# Patient Record
Sex: FEMALE | Race: WHITE | NOT HISPANIC OR LATINO | Employment: FULL TIME | URBAN - METROPOLITAN AREA
[De-identification: names, ages, dates, MRNs, and addresses within clinical notes are randomized per-mention and may not be internally consistent; named-entity substitution may affect disease eponyms.]

---
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
## 2018-09-17 ENCOUNTER — TRANSFERRED RECORDS (OUTPATIENT)
Dept: HEALTH INFORMATION MANAGEMENT | Facility: CLINIC | Age: 18
End: 2018-09-17

## 2018-10-11 ENCOUNTER — TRANSFERRED RECORDS (OUTPATIENT)
Dept: HEALTH INFORMATION MANAGEMENT | Facility: CLINIC | Age: 18
End: 2018-10-11

## 2018-10-11 ENCOUNTER — MEDICAL CORRESPONDENCE (OUTPATIENT)
Dept: HEALTH INFORMATION MANAGEMENT | Facility: CLINIC | Age: 18
End: 2018-10-11

## 2018-10-17 NOTE — TELEPHONE ENCOUNTER
Date of appointment: 10/24/2018   Diagnosis/reason for appointment: Anemia  Referring provider/facility: Dr. Tena Conklin  Who called:    Recent Studies  Imaging:  Pathology:  Labs:  Previous chemo/radiation (if known):    Records requested from: [facility]  Records received from:    Additional information:

## 2018-10-23 ENCOUNTER — HEALTH MAINTENANCE LETTER (OUTPATIENT)
Age: 18
End: 2018-10-23

## 2018-10-24 ENCOUNTER — PRE VISIT (OUTPATIENT)
Dept: ONCOLOGY | Facility: CLINIC | Age: 18
End: 2018-10-24

## 2018-10-25 ENCOUNTER — ONCOLOGY VISIT (OUTPATIENT)
Dept: ONCOLOGY | Facility: CLINIC | Age: 18
End: 2018-10-25
Attending: INTERNAL MEDICINE
Payer: COMMERCIAL

## 2018-10-25 VITALS
RESPIRATION RATE: 16 BRPM | DIASTOLIC BLOOD PRESSURE: 75 MMHG | BODY MASS INDEX: 20.83 KG/M2 | HEART RATE: 70 BPM | HEIGHT: 64 IN | TEMPERATURE: 98 F | WEIGHT: 122 LBS | SYSTOLIC BLOOD PRESSURE: 120 MMHG | OXYGEN SATURATION: 70 %

## 2018-10-25 DIAGNOSIS — D69.6 THROMBOCYTOPENIA (H): ICD-10-CM

## 2018-10-25 DIAGNOSIS — R23.3 PETECHIAL RASH: Primary | ICD-10-CM

## 2018-10-25 DIAGNOSIS — R23.3 PETECHIAL RASH: ICD-10-CM

## 2018-10-25 LAB
BASOPHILS # BLD AUTO: 0 10E9/L (ref 0–0.2)
BASOPHILS NFR BLD AUTO: 0.5 %
COPATH REPORT: NORMAL
DIFFERENTIAL METHOD BLD: ABNORMAL
EOSINOPHIL # BLD AUTO: 0.1 10E9/L (ref 0–0.7)
EOSINOPHIL NFR BLD AUTO: 1.9 %
ERYTHROCYTE [DISTWIDTH] IN BLOOD BY AUTOMATED COUNT: 12.4 % (ref 10–15)
FERRITIN SERPL-MCNC: 22 NG/ML (ref 12–150)
HBV CORE AB SERPL QL IA: NONREACTIVE
HBV SURFACE AB SERPL IA-ACNC: 53.47 M[IU]/ML
HBV SURFACE AG SERPL QL IA: NONREACTIVE
HCT VFR BLD AUTO: 37.7 % (ref 35–47)
HCV AB SERPL QL IA: NONREACTIVE
HGB BLD-MCNC: 13 G/DL (ref 11.7–15.7)
HIV 1+2 AB+HIV1 P24 AG SERPL QL IA: NONREACTIVE
IMM GRANULOCYTES # BLD: 0 10E9/L (ref 0–0.4)
IMM GRANULOCYTES NFR BLD: 0.3 %
LYMPHOCYTES # BLD AUTO: 1.4 10E9/L (ref 0.8–5.3)
LYMPHOCYTES NFR BLD AUTO: 36.3 %
MCH RBC QN AUTO: 31.1 PG (ref 26.5–33)
MCHC RBC AUTO-ENTMCNC: 34.5 G/DL (ref 31.5–36.5)
MCV RBC AUTO: 90 FL (ref 78–100)
MONOCYTES # BLD AUTO: 0.4 10E9/L (ref 0–1.3)
MONOCYTES NFR BLD AUTO: 9.9 %
NEUTROPHILS # BLD AUTO: 1.9 10E9/L (ref 1.6–8.3)
NEUTROPHILS NFR BLD AUTO: 51.1 %
NRBC # BLD AUTO: 0 10*3/UL
NRBC BLD AUTO-RTO: 0 /100
PLATELET # BLD AUTO: 111 10E9/L (ref 150–450)
RBC # BLD AUTO: 4.18 10E12/L (ref 3.8–5.2)
RETICS # AUTO: 56.4 10E9/L (ref 25–95)
RETICS/RBC NFR AUTO: 1.4 % (ref 0.5–2)
RHEUMATOID FACT SER NEPH-ACNC: <20 IU/ML (ref 0–20)
TSH SERPL DL<=0.005 MIU/L-ACNC: 1.23 MU/L (ref 0.4–4)
VIT B12 SERPL-MCNC: 305 PG/ML (ref 193–986)
WBC # BLD AUTO: 3.7 10E9/L (ref 4–11)

## 2018-10-25 PROCEDURE — 85245 CLOT FACTOR VIII VW RISTOCTN: CPT | Performed by: INTERNAL MEDICINE

## 2018-10-25 PROCEDURE — 87389 HIV-1 AG W/HIV-1&-2 AB AG IA: CPT | Performed by: INTERNAL MEDICINE

## 2018-10-25 PROCEDURE — 85045 AUTOMATED RETICULOCYTE COUNT: CPT | Performed by: INTERNAL MEDICINE

## 2018-10-25 PROCEDURE — 85240 CLOT FACTOR VIII AHG 1 STAGE: CPT | Performed by: INTERNAL MEDICINE

## 2018-10-25 PROCEDURE — 00000167 ZZHCL STATISTIC INR NC: Performed by: INTERNAL MEDICINE

## 2018-10-25 PROCEDURE — 86704 HEP B CORE ANTIBODY TOTAL: CPT | Performed by: INTERNAL MEDICINE

## 2018-10-25 PROCEDURE — 85025 COMPLETE CBC W/AUTO DIFF WBC: CPT | Performed by: INTERNAL MEDICINE

## 2018-10-25 PROCEDURE — 40000611 ZZHCL STATISTIC MORPHOLOGY W/INTERP HEMEPATH TC 85060: Performed by: INTERNAL MEDICINE

## 2018-10-25 PROCEDURE — 99205 OFFICE O/P NEW HI 60 MIN: CPT | Mod: ZP | Performed by: INTERNAL MEDICINE

## 2018-10-25 PROCEDURE — 85246 CLOT FACTOR VIII VW ANTIGEN: CPT | Performed by: INTERNAL MEDICINE

## 2018-10-25 PROCEDURE — 82728 ASSAY OF FERRITIN: CPT | Performed by: INTERNAL MEDICINE

## 2018-10-25 PROCEDURE — 00000328 ZZHCL STATISTIC PTT NC: Performed by: INTERNAL MEDICINE

## 2018-10-25 PROCEDURE — 36415 COLL VENOUS BLD VENIPUNCTURE: CPT | Performed by: INTERNAL MEDICINE

## 2018-10-25 PROCEDURE — 87340 HEPATITIS B SURFACE AG IA: CPT | Performed by: INTERNAL MEDICINE

## 2018-10-25 PROCEDURE — 86431 RHEUMATOID FACTOR QUANT: CPT | Performed by: INTERNAL MEDICINE

## 2018-10-25 PROCEDURE — 84443 ASSAY THYROID STIM HORMONE: CPT | Performed by: INTERNAL MEDICINE

## 2018-10-25 PROCEDURE — G0463 HOSPITAL OUTPT CLINIC VISIT: HCPCS | Mod: ZF

## 2018-10-25 PROCEDURE — 86038 ANTINUCLEAR ANTIBODIES: CPT | Performed by: INTERNAL MEDICINE

## 2018-10-25 PROCEDURE — 86706 HEP B SURFACE ANTIBODY: CPT | Performed by: INTERNAL MEDICINE

## 2018-10-25 PROCEDURE — 00000401 ZZHCL STATISTIC THROMBIN TIME NC: Performed by: INTERNAL MEDICINE

## 2018-10-25 PROCEDURE — 82607 VITAMIN B-12: CPT | Performed by: INTERNAL MEDICINE

## 2018-10-25 PROCEDURE — 86803 HEPATITIS C AB TEST: CPT | Performed by: INTERNAL MEDICINE

## 2018-10-25 ASSESSMENT — PAIN SCALES - GENERAL: PAINLEVEL: NO PAIN (0)

## 2018-10-25 NOTE — NURSING NOTE
Oncology Rooming Note    October 25, 2018 7:11 AM   Victoria Solares is a 18 year old female who presents for:    Chief Complaint   Patient presents with     Oncology Clinic Visit     new - anemia      Initial Vitals: /75 (BP Location: Left arm, Patient Position: Chair, Cuff Size: Adult Regular)  Pulse 70  Temp 98  F (36.7  C) (Oral)  Resp 16  Wt 55.3 kg (122 lb)  SpO2 (!) 70% There is no height or weight on file to calculate BMI. There is no height or weight on file to calculate BSA.  No Pain (0) Comment: Data Unavailable   No LMP recorded.  Allergies reviewed: Yes  Medications reviewed: Yes    Medications: Medication refills not needed today.  Pharmacy name entered into EPIC: Data Unavailable    Clinical concerns: New patient      6 minutes for nursing intake (face to face time)     Aissatou Damon [facility]

## 2018-10-25 NOTE — PROGRESS NOTES
"Hematology Consult Note    Reason for consult: thrombocytopenia    History of present illness: Ms. Solares is referred by Tena Conklin PA-C at [facility] for thrombocytopenia.  She presented in September for some routine care regarding some anxiety and had a CBC with differential and conference of metabolic panel checked.  She was noted to have a mildly low platelet count at 132.  All other labs are normal.  Repeat check on 9/24/18 showed a platelets of 123.  Repeat check on 10/11/18 showed platelets 121, white count 3.9, hemoglobin 14.1, MCV 90.    She reports that this past summer she had a rash all across her abdomen and back which was raised, blotchy, pink.  She had some stinging and burning with it, no itching.  She went to a dermatologist and is felt to be \"a viral rash\" and went away in a few weeks.  However at that time the dermatologist noticed an atypical mole.  It was biopsied and it was severely atypic, but not melanoma.  She also notes that she had a skin lesion on her left arm several years ago that was removed and apparently it was some sort of vascular abnormality.  She does not have these anywhere else.    She also comments that she has had intermittent petechiae late in July or early August she had numerous petechiae on her legs and she was able to show me a picture.  They were not coalescing,  But were clearly petechiae.  Right now she has a couple on her neck and had some on her left antecubital fossa.  Those have resolved.  She has 1 small spots on her left neck.  She is also noted that she has tiny cherry angiomas on her chest and abdomen.  She has not noticed them elsewhere.  She does not have epistaxis, gum bleeding, melena, hematochezia, easy bruising.  She has had menorrhagia in the past whereby she would have to change a pad or tampon about every hour.  Her menses would last 7-9 days.  She was on iron tablets for a while.  She now has a Mirena IUD.  She had tonsils removed is a " "1-year-old with no excessive bleeding with that.      Past medical history:  -Infectious mononucleosis starting age 11, says she felt tired for several years.  -Anxiety and depression   -Status post tonsillectomy age 1, no bleeding issues.    Medications:  Fluoxetine  Hydroxyzine as needed  Mirena IUD  No herbal supplements, protein powder, megavitamins.  She drinks \"muscle milk\" which is soy based.    Family history:  Anxiety in mother, father, sister.  Maternal grandmother has lupus, maternal aunt has rheumatoid arthritis, paternal grandmother has breast cancer.  Mother does not have any autoimmune problems or menorrhagia.  She has a sister is healthy.  Father is healthy.      Review of Systems:  As in history above.  She says she is very intolerant, gluten is okay.  She is up-to-date on all the routine childhood vaccinations, including meningococcal vaccines and Gardasil.  She had a flu shot she thinks about September 8, no problems with that vaccination.  The rest of the >10 point ROS is negative.      PHYSICAL EXAMINATION:  /75 (BP Location: Left arm, Patient Position: Chair, Cuff Size: Adult Regular)  Pulse 70  Temp 98  F (36.7  C) (Oral)  Resp 16  Wt 55.3 kg (122 lb)  SpO2 (!) 70%    General appearance:  Patient is 17 yo woman, initially somewhat anxious appearing, but otherwise appears healthy.  HEENT:  No pallor, icterus, or mucositis.  No thyromegaly.  No angiomas on lips or tongue.  No wet purpura or petechiae and oral mucosa.  Lymph nodes: About 5 mm right occipital node, barely noticeable-she pointed out to me.  No cervical, supraclavicular, axillary, or inguinal lymphadenopathy.   Lungs:  Clear to auscultation bilaterally.   Heart:  Regular rate and rhythm; no S3 S4 or murmer.     Abdomen:  Positive bowel sounds, soft and nontender, nondistended.  No hepatomegaly. No splenomegaly appreciated.    Extremities:  No joint swelling or tenderness.  No ankle edema.     Skin: She has tiny cherry " angiomas on chest, none on fingers.  A few tiny petechiae on neck chest.  She has a slightly larger coalescence of petechiae left neck.  No rash,  Ecchymoses.    Labs pending.    Assessment and recommendation: This is a 18-year-old woman with mildly low platelet count 2 is had intermittent petechiae, also this unusual history of a rash.  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  Another cause of slightly low platelets is chronic infection such as HIV, hepatitis B, hepatitis C. Less likely B12 or iron deficiency or hypothyroidism.  Von Willebrand disease is a possibility although she did not have problems with her tonsillectomy, which would be a major stressor that usually would reveal that she has a bleeding inherited bleeding disorder.  I am not sure how to put this together with the angiomas. It is possible that she has hereditary hemorrhagic telangiectasia, however usually that presents with nosebleeds and there is usually a family history.      The plan today is to check these labs and I will be in contact with her:  -CBC DP, recheck, smear  -Hepatitis B, C, HIV  -JAY, RA  -Ferritin, B12, TSH reflex  -Von Willebrand disease panel    Tentative follow-up in 3 weeks, but will be adjusted depending on results.  May end up checking CBCs once a week to see fluctuation in her platelets.  She is to call if she starts developing a lot more petechiae and will get a CBC at that time.      Alesia Henry MD  Hematology

## 2018-10-25 NOTE — LETTER
"10/25/2018       RE: Victoria Solares  [address]  [facility] [address]     Dear Colleague,    Thank you for referring your patient, Victoria Solares, to the [facility]. Please see a copy of my visit note below.    Hematology Consult Note    Reason for consult: thrombocytopenia    History of present illness: Ms. Solares is referred by Tena Conklin PA-C at [facility] for thrombocytopenia.  She presented in September for some routine care regarding some anxiety and had a CBC with differential and conference of metabolic panel checked.  She was noted to have a mildly low platelet count at 132.  All other labs are normal.  Repeat check on 9/24/18 showed a platelets of 123.  Repeat check on 10/11/18 showed platelets 121, white count 3.9, hemoglobin 14.1, MCV 90.    She reports that this past summer she had a rash all across her abdomen and back which was raised, blotchy, pink.  She had some stinging and burning with it, no itching.  She went to a dermatologist and is felt to be \"a viral rash\" and went away in a few weeks.  However at that time the dermatologist noticed an atypical mole.  It was biopsied and it was severely atypic, but not melanoma.  She also notes that she had a skin lesion on her left arm several years ago that was removed and apparently it was some sort of vascular abnormality.  She does not have these anywhere else.    She also comments that she has had intermittent petechiae late in July or early August she had numerous petechiae on her legs and she was able to show me a picture.  They were not coalescing,  But were clearly petechiae.  Right now she has a couple on her neck and had some on her left antecubital fossa.  Those have resolved.  She has 1 small spots on her left neck.  She is also noted that she has tiny cherry angiomas on her chest and abdomen.  She has not noticed them elsewhere.  She does not have epistaxis, gum bleeding, melena, hematochezia, easy bruising.  She has " "had menorrhagia in the past whereby she would have to change a pad or tampon about every hour.  Her menses would last 7-9 days.  She was on iron tablets for a while.  She now has a Mirena IUD.  She had tonsils removed is a 1-year-old with no excessive bleeding with that.      Past medical history:  -Infectious mononucleosis starting age 11, says she felt tired for several years.  -Anxiety and depression   -Status post tonsillectomy age 1, no bleeding issues.    Medications:  Fluoxetine  Hydroxyzine as needed  Mirena IUD  No herbal supplements, protein powder, megavitamins.  She drinks \"muscle milk\" which is soy based.    Family history:  Anxiety in mother, father, sister.  Maternal grandmother has lupus, maternal aunt has rheumatoid arthritis, paternal grandmother has breast cancer.  Mother does not have any autoimmune problems or menorrhagia.  She has a sister is healthy.  Father is healthy.      Review of Systems:  As in history above.  She says she is very intolerant, gluten is okay.  She is up-to-date on all the routine childhood vaccinations, including meningococcal vaccines and Gardasil.  She had a flu shot she thinks about September 8, no problems with that vaccination.  The rest of the >10 point ROS is negative.      PHYSICAL EXAMINATION:  /75 (BP Location: Left arm, Patient Position: Chair, Cuff Size: Adult Regular)  Pulse 70  Temp 98  F (36.7  C) (Oral)  Resp 16  Wt 55.3 kg (122 lb)  SpO2 (!) 70%    General appearance:  Patient is 17 yo woman, initially somewhat anxious appearing, but otherwise appears healthy.  HEENT:  No pallor, icterus, or mucositis.  No thyromegaly.  No angiomas on lips or tongue.  No wet purpura or petechiae and oral mucosa.  Lymph nodes: About 5 mm right occipital node, barely noticeable-she pointed out to me.  No cervical, supraclavicular, axillary, or inguinal lymphadenopathy.   Lungs:  Clear to auscultation bilaterally.   Heart:  Regular rate and rhythm; no S3 S4 or " murmer.     Abdomen:  Positive bowel sounds, soft and nontender, nondistended.  No hepatomegaly. No splenomegaly appreciated.    Extremities:  No joint swelling or tenderness.  No ankle edema.     Skin: She has tiny cherry angiomas on chest, none on fingers.  A few tiny petechiae on neck chest.  She has a slightly larger coalescence of petechiae left neck.  No rash,  Ecchymoses.    Labs pending.    Assessment and recommendation: This is a 18-year-old woman with mildly low platelet count 2 is had intermittent petechiae, also this unusual history of a rash.  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  Another cause of slightly low platelets is chronic infection such as HIV, hepatitis B, hepatitis C. Less likely B12 or iron deficiency or hypothyroidism.  Von Willebrand disease is a possibility although she did not have problems with her tonsillectomy, which would be a major stressor that usually would reveal that she has a bleeding inherited bleeding disorder.  I am not sure how to put this together with the angiomas. It is possible that she has hereditary hemorrhagic telangiectasia, however usually that presents with nosebleeds and there is usually a family history.      The plan today is to check these labs and I will be in contact with her:  -CBC DP, recheck, smear  -Hepatitis B, C, HIV  -JAY, RA  -Ferritin, B12, TSH reflex  -Von Willebrand disease panel    Tentative follow-up in 3 weeks, but will be adjusted depending on results.  May end up checking CBCs once a week to see fluctuation in her platelets.  She is to call if she starts developing a lot more petechiae and will get a CBC at that time.      Alesia Henry MD  Hematology

## 2018-10-25 NOTE — MR AVS SNAPSHOT
After Visit Summary   10/25/2018    Victoria Solares    MRN: [id number]           Patient Information     Date Of Birth          2000        Visit Information        Provider Department      10/25/2018 7:00 AM Alesia Henry MD [facility]        Today's Diagnoses     Petechial rash    -  1    Thrombocytopenia (H)           Follow-ups after your visit        Follow-up notes from your care team     Return in about 3 weeks (around 11/15/2018) for f/u Elaine.      Your next 10 appointments already scheduled     Nov 21, 2018  8:00 AM CST   Lab with  LAB   [facility] ([facility])    [address]  1st Floor  [facility] [id number] [phone]            Nov 21, 2018  8:30 AM CST   (Arrive by 8:15 AM)   Return Visit with Alesia Henry MD   [facility] ([facility])    [address]  [address]  [facility] [address] [phone]              Future tests that were ordered for you today     Open Future Orders        Priority Expected Expires Ordered    CBC with platelets differential Routine 11/15/2018 1/25/2019 10/25/2018            Who to contact     If you have questions or need follow up information about today's clinic visit or your schedule please contact [facility] directly at [phone].  Normal or non-critical lab and imaging results will be communicated to you by MyChart, letter or phone within 4 business days after the clinic has received the results. If you do not hear from us within 7 days, please contact the clinic through MyChart or phone. If you have a critical or abnormal lab result, we will notify you by phone as soon as possible.  Submit refill requests through [employer] or call your pharmacy and they will forward the refill request to us. Please allow 3 business days for your refill to be completed.          Additional Information About Your  "Visit        MyChart Information     [employer] gives you secure access to your electronic health record. If you see a primary care provider, you can also send messages to your care team and make appointments. If you have questions, please call your primary care clinic.  If you do not have a primary care provider, please call [phone] and they will assist you.        Care EveryWhere ID     This is your Care EveryWhere ID. This could be used by other organizations to access your [facility] medical records  [id number]        Your Vitals Were     Pulse Temperature Respirations Height Pulse Oximetry BMI (Body Mass Index)    70 98  F (36.7  C) (Oral) 16 1.619 m (5' 3.75\") 70% 21.11 kg/m2       Blood Pressure from Last 3 Encounters:   10/25/18 120/75   10/11/17 112/67    Weight from Last 3 Encounters:   10/25/18 55.3 kg (122 lb)               Primary Care Provider Office Phone # Fax #    Sheree BROWN Love [phone]       [place] [id number] [address] [address]        Equal Access to Services     [facility]: Hadii aad ku hadasho Soomaali, waaxda luqadaha, qaybta kaalmada adeegyada, raquel hernandez . So [facility] [phone].    ATENCIÓN: Si habla español, tiene a guerrero disposición servicios gratCardFlightos de asistencia lingüística. [facility] [phone].    We comply with applicable federal civil rights laws and [place] laws. We do not discriminate on the basis of race, color, national origin, age, disability, sex, sexual orientation, or gender identity.            Thank you!     Thank you for choosing [facility]  for your care. Our goal is always to provide you with excellent care. Hearing back from our patients is one way we can continue to improve our services. Please take a few minutes to complete the written survey that you may receive in the mail after your visit with us. Thank you!             Your Updated Medication List - " Protect others around you: Learn how to safely use, store and throw away your medicines at www.disposemymeds.org.          This list is accurate as of 10/25/18  8:16 AM.  Always use your most recent med list.                   Brand Name Dispense Instructions for use Diagnosis    etonogestrel-ethinyl estradiol 0.12-0.015 MG/24HR vaginal ring    NUVARING     Place 1 each vaginally every 28 days        FLUOXETINE HCL PO      Take 10 mg by mouth daily        HYDROXYZINE HCL PO      Take by mouth 3 times daily as needed for itching

## 2018-10-26 LAB
ANA SER QL IF: NEGATIVE
FACT VIII ACT/NOR PPP: 106 % (ref 55–200)
VON WILLEBRAND INTERPRETATION: NORMAL
VWF CBA/VWF AG PPP IA-RTO: 122 % (ref 50–200)
VWF:AC ACT/NOR PPP IA: 100 % (ref 50–180)

## 2018-11-21 ENCOUNTER — ONCOLOGY VISIT (OUTPATIENT)
Dept: ONCOLOGY | Facility: CLINIC | Age: 18
End: 2018-11-21
Attending: INTERNAL MEDICINE
Payer: COMMERCIAL

## 2018-11-21 VITALS
RESPIRATION RATE: 18 BRPM | BODY MASS INDEX: 21.07 KG/M2 | TEMPERATURE: 97.8 F | HEIGHT: 64 IN | SYSTOLIC BLOOD PRESSURE: 119 MMHG | WEIGHT: 123.4 LBS | HEART RATE: 71 BPM | DIASTOLIC BLOOD PRESSURE: 78 MMHG | OXYGEN SATURATION: 100 %

## 2018-11-21 DIAGNOSIS — D69.6 THROMBOCYTOPENIA (H): Primary | ICD-10-CM

## 2018-11-21 DIAGNOSIS — D69.6 THROMBOCYTOPENIA (H): ICD-10-CM

## 2018-11-21 DIAGNOSIS — R23.3 PETECHIAL RASH: ICD-10-CM

## 2018-11-21 LAB
BASOPHILS # BLD AUTO: 0 10E9/L (ref 0–0.2)
BASOPHILS NFR BLD AUTO: 0.9 %
DIFFERENTIAL METHOD BLD: ABNORMAL
EOSINOPHIL # BLD AUTO: 0.1 10E9/L (ref 0–0.7)
EOSINOPHIL NFR BLD AUTO: 2.1 %
ERYTHROCYTE [DISTWIDTH] IN BLOOD BY AUTOMATED COUNT: 12.3 % (ref 10–15)
HCT VFR BLD AUTO: 41.4 % (ref 35–47)
HGB BLD-MCNC: 14 G/DL (ref 11.7–15.7)
IMM GRANULOCYTES # BLD: 0 10E9/L (ref 0–0.4)
IMM GRANULOCYTES NFR BLD: 0 %
LYMPHOCYTES # BLD AUTO: 2.2 10E9/L (ref 0.8–5.3)
LYMPHOCYTES NFR BLD AUTO: 49.4 %
MCH RBC QN AUTO: 30.8 PG (ref 26.5–33)
MCHC RBC AUTO-ENTMCNC: 33.8 G/DL (ref 31.5–36.5)
MCV RBC AUTO: 91 FL (ref 78–100)
MONOCYTES # BLD AUTO: 0.4 10E9/L (ref 0–1.3)
MONOCYTES NFR BLD AUTO: 8.7 %
NEUTROPHILS # BLD AUTO: 1.7 10E9/L (ref 1.6–8.3)
NEUTROPHILS NFR BLD AUTO: 38.9 %
NRBC # BLD AUTO: 0 10*3/UL
NRBC BLD AUTO-RTO: 0 /100
PLATELET # BLD AUTO: 142 10E9/L (ref 150–450)
RBC # BLD AUTO: 4.55 10E12/L (ref 3.8–5.2)
WBC # BLD AUTO: 4.4 10E9/L (ref 4–11)

## 2018-11-21 PROCEDURE — G0463 HOSPITAL OUTPT CLINIC VISIT: HCPCS | Mod: ZF

## 2018-11-21 PROCEDURE — 36415 COLL VENOUS BLD VENIPUNCTURE: CPT | Performed by: INTERNAL MEDICINE

## 2018-11-21 PROCEDURE — 85025 COMPLETE CBC W/AUTO DIFF WBC: CPT | Performed by: INTERNAL MEDICINE

## 2018-11-21 PROCEDURE — 99213 OFFICE O/P EST LOW 20 MIN: CPT | Mod: ZP | Performed by: INTERNAL MEDICINE

## 2018-11-21 ASSESSMENT — PAIN SCALES - GENERAL: PAINLEVEL: NO PAIN (0)

## 2018-11-21 NOTE — PROGRESS NOTES
"PROBLEM LIST:  1.  Thrombocytopenia, mild  2.  History of infectious mononucleosis, age 11  3.  Anxiety and depression.    Interim history: Ms. Solares is here for follow-up of thrombocytopenia.  She is accompanied by her father.  I last saw her on 10/25/18.  On that day extensive evaluation including von Willebrand panel, JAY, rheumatoid factor, hepatitis B, hepatitis C, HIV, TSH, B12, ferritin were all normal.  She had a platelet count of 111 with a mildly low total white count of 3.7 within normal neutrophil count of 1.9 x 10^9/L per liter, Normal hemoglobin.    Today she is feeling okay.  She reports that she had a upper respiratory infection lasting about 10 days, felt back to normal about 2 days ago.  She also had this episode where she developed some pain and swelling behind her left knee when evening.  It resolved by the next day.  She did show me a picture wearing her popliteal space it does look a bit erythematous and edematous.  No epistaxis, gum bleeding, melena, hematochezia, easy bruising.    PHYSICAL EXAMINATION:  /78 (BP Location: Right arm, Patient Position: Sitting, Cuff Size: Adult Regular)  Pulse 71  Temp 97.8  F (36.6  C) (Tympanic)  Resp 18  Ht 1.619 m (5' 3.74\")  Wt 56 kg (123 lb 6.4 oz)  SpO2 100%  BMI 21.35 kg/m2    General appearance:  Patient is 17 yo woman in no acute distress.     HEENT:  No pallor, icterus, or mucositis.  No thyromegaly.   Lymph nodes:  No cervical, supraclavicular, axillary, or inguinal lymphadenopathy.   Lungs:  Clear to auscultation bilaterally.   Heart:  Regular rate and rhythm; no S3 S4 or murmer.     Abdomen:  Positive bowel sounds, soft and nontender, nondistended.  No hepatomegaly. No splenomegaly appreciated.    Extremities: No obvious swelling behind either knee.  No joint swelling or tenderness.  No ankle edema.     Skin: A few tiny cherry angiomas on right upper neck right arm.  No rash, no petechiae or ecchymoses.    Labs:  Results for JONATHAN, " SHIRA (MRN [id number]) as of 11/21/2018 08:58   Ref. Range 11/21/2018 08:13   WBC Latest Ref Range: 4.0 - 11.0 10e9/L 4.4   Hemoglobin Latest Ref Range: 11.7 - 15.7 g/dL 14.0   Hematocrit Latest Ref Range: 35.0 - 47.0 % 41.4   Platelet Count Latest Ref Range: 150 - 450 10e9/L 142 (L)   RBC Count Latest Ref Range: 3.8 - 5.2 10e12/L 4.55   MCV Latest Ref Range: 78 - 100 fl 91   MCH Latest Ref Range: 26.5 - 33.0 pg 30.8   MCHC Latest Ref Range: 31.5 - 36.5 g/dL 33.8   RDW Latest Ref Range: 10.0 - 15.0 % 12.3   Diff Method Unknown Automated Method   % Neutrophils Latest Units: % 38.9   % Lymphocytes Latest Units: % 49.4   % Monocytes Latest Units: % 8.7   % Eosinophils Latest Units: % 2.1   % Basophils Latest Units: % 0.9   % Immature Granulocytes Latest Units: % 0.0   Nucleated RBCs Latest Ref Range: 0 /100 0   Absolute Neutrophil Latest Ref Range: 1.6 - 8.3 10e9/L 1.7   Absolute Lymphocytes Latest Ref Range: 0.8 - 5.3 10e9/L 2.2   Absolute Monocytes Latest Ref Range: 0.0 - 1.3 10e9/L 0.4   Absolute Eosinophils Latest Ref Range: 0.0 - 0.7 10e9/L 0.1   Absolute Basophils Latest Ref Range: 0.0 - 0.2 10e9/L 0.0   Abs Immature Granulocytes Latest Ref Range: 0 - 0.4 10e9/L 0.0   Absolute Nucleated RBC Unknown 0.0     Results for SHIRA CASTILLO (MRN [id number]) as of 11/21/2018 14:07   Ref. Range 10/25/2018 08:25   Ferritin Latest Ref Range: 12 - 150 ng/mL 22   Rheumatoid Factor Latest Ref Range: <20 IU/mL <20   TSH Latest Ref Range: 0.40 - 4.00 mU/L 1.23   Vitamin B12 Latest Ref Range: 193 - 986 pg/mL 305   Results for SHIRA CASTILLO (MRN [id number]) as of 11/21/2018 14:07   Ref. Range 10/25/2018 08:23 10/25/2018 08:25   Factor 8 Assay Latest Ref Range: 55 - 200 %  106   von Willebrand Antigen Latest Ref Range: 50 - 200 %  122   von Willebrand Factor Activity Latest Ref Range: 50 - 180 % 100    Results for SHIRA CASTILLO (MRN [id number]) as of 11/21/2018 14:07   Ref. Range 10/25/2018 08:25   Hep B Surface Agn Latest Ref Range:  NR^Nonreactive  Nonreactive   Hepatitis B Surface Antibody Latest Ref Range: <8.00 m[IU]/mL 53.47 (H)   Hepatitis B Core Edith Latest Ref Range: NR^Nonreactive  Nonreactive   Hepatitis C Antibody Latest Ref Range: NR^Nonreactive  Nonreactive   HIV Antigen Antibody Combo Latest Ref Range: NR^Nonreactive     Nonreactive     JAY interpretation Negative  NEG^Negative  Final 10/25/2018  8:25 AM 51      Comment:                                        Reference range:   <1:40  NEGATIVE   1:40 - 1:80  BORDERLINE POSITIVE   >1:80 POSITIVE   Patient Name: SHIRA CASTILLO   MR#: [id number]   Specimen #: [id number]   Collected: 10/25/2018   Received: 10/25/2018   Reported: 10/25/2018 16:51   Ordering Phy(s): ALFRED RAMON     For improved result formatting, select 'View Enhanced Report Format' under    Linked Documents section.     TEST(S):   Blood Smear Morphology     FINAL DIAGNOSIS:   Peripheral blood smear:        Slight leukopenia        Thrombocytopenia with overall normal platelet morphology     I have personally reviewed all specimens and/or slides, including the   listed special stains, and used them   with my medical judgment to determine the final diagnosis.     Electronically signed out by:     Laz Starkey M.D.,[facility]     Technical testing/processing performed at [place], [place]     CLINICAL HISTORY:   18-year-old female with thrombocytopenia     CLINICAL LAB RESULTS:   Battery Order No. Lab Test Code Clinical Result Ref. Range Units Result   Date   Hemogram/Diff/PLT Q29825  WBC Count L 3.7 4.0-11.0 10e9/L 10/25/2018   08:33        RBC Count 4.18 3.8-5.2 10e12/L 10/25/2018 08:33        Hemoglobin 13.0 11.7-15.7 g/dL 10/25/2018 08:33        Hematocrit 37.7 35.0-47.0 % 10/25/2018 08:33        MCV 90  fl 10/25/2018 08:33        MCH 31.1 26.5-33.0 pg 10/25/2018 08:33        MCHC 34.5 31.5-36.5 g/dL 10/25/2018 08:33        RDW 12.4  10.0-15.0 % 10/25/2018 08:33        Platelet Count L 111 150-450 10e9/L 10/25/2018 08:33         SEE TEXT   10/25/2018 08:33        Text/Comments:   Automated Method        % Neutrophils 51.1  % 10/25/2018 08:33        % Lymphocytes 36.3  % 10/25/2018 08:33        % Monocytes 9.9  % 10/25/2018 08:33        % Eosinophils 1.9  % 10/25/2018 08:33        % Basophils 0.5  % 10/25/2018 08:33        % Immature Grans 0.3  % 10/25/2018 08:33        Nucleated RBCs 0 0 /100 10/25/2018 08:33        abs Neutrophils 1.9 1.6-8.3 10e9/L 10/25/2018 08:33        abs Lymphocytes 1.4 0.8-5.3 10e9/L 10/25/2018 08:33        abs Monocytes 0.4 0.0-1.3 10e9/L 10/25/2018 08:33        abs Eosinophils 0.1 0.0-0.7 10e9/L 10/25/2018 08:33        abs Basophils 0.0 0.0-0.2 10e9/L 10/25/2018 08:33        abs Imm Granulocytes 0.0 0-0.4 10e9/L 10/25/2018 08:33        abs NRBC 0.0   10/25/2018 08:33     Retic   Retic abs 56.4 25-95 10e9/L 10/25/2018 08:33     MICROSCOPIC DESCRIPTION:   Peripheral Blood   The red cells appear normochromic.  Poikilocytosis is minimal.     Polychromasia is not increased.  Rouleaux   formation is not increased. The morphology of the platelets is normal.     CPT Codes:   A: 04588-[id number]     TESTING LAB LOCATION:   [facility] [address]   [address] [place]   [address]     Assessment recommendation:  1.  Mild thrombocytopenia- extensive evaluation has been unrevealing.  I suspect that this is due to mild autoimmune thrombocytopenia (ITP) or suppression from a self-limited viral syndrome.  I discussed that these are diagnoses of exclusion.  She has had numerous tests with none of them pointing to anything seriously wrong.  Her hemoglobin and white count are normal, so it is unlikely that she has a primary bone marrow disorder.  It is also unlikely that she has a serious autoimmune condition, such as lupus in the absence of other symptoms.  Since the platelet  count has not completely resolved , I will have her follow-up with a repeat CBC with differential and see me in clinic in about 3 months.  She should contact me if she is having any bleeding symptoms or other concerns regarding her platelets.  2.  History of lump behind knee -this was likely a Baker's cyst, may have appeared red due to her rubbing on it.  There is no abnormality now.  I do not think it is in any way related to the mild thrombocytopenia.    Alesia Henry MD  Hematology

## 2018-11-21 NOTE — NURSING NOTE
"Oncology Rooming Note    November 21, 2018 8:28 AM   Victoria Solares is a 18 year old female who presents for:    Chief Complaint   Patient presents with     Oncology Clinic Visit     Return visit related to Anemia     Initial Vitals: Ht 1.619 m (5' 3.74\")  BMI 21.11 kg/m2 Estimated body mass index is 21.11 kg/(m^2) as calculated from the following:    Height as of this encounter: 1.619 m (5' 3.74\").    Weight as of 10/25/18: 55.3 kg (122 lb). Body surface area is 1.58 meters squared.  No Pain (0) Comment: Data Unavailable   No LMP recorded.  Allergies reviewed: Yes  Medications reviewed: Yes    Medications: Medication refills not needed today.  Pharmacy name entered into EPIC: Data Unavailable    Clinical concerns: No new concerns. Provideer was notified.    10 minutes for nursing intake (face to face time)     Maureen Parekh LPN            "

## 2018-11-21 NOTE — MR AVS SNAPSHOT
After Visit Summary   11/21/2018    Victoria Solares    MRN: [id number]           Patient Information     Date Of Birth          2000        Visit Information        Provider Department      11/21/2018 8:30 AM Alesia Henry MD [facility]         Follow-ups after your visit        Follow-up notes from your care team     Return in about 3 months (around 2/21/2019) for f/u Elaine.      Your next 10 appointments already scheduled     Feb 20, 2019  8:30 AM CST   Lab with  LAB   [facility])    [address] [place] [address]  1st Floor  [facility] [id number] [phone]            Feb 20, 2019  9:00 AM CST   (Arrive by 8:45 AM)   Return Visit with Alesia Henry MD   [facility] ([facility])    [address] [place] [address]  [address]  [facility] [id number] [phone]              Who to contact     If you have questions or need follow up information about today's clinic visit or your schedule please contact [facility] directly at [phone].  Normal or non-critical lab and imaging results will be communicated to you by MyChart, letter or phone within 4 business days after the clinic has received the results. If you do not hear from us within 7 days, please contact the clinic through [employer] or phone. If you have a critical or abnormal lab result, we will notify you by phone as soon as possible.  Submit refill requests through [employer] or call your pharmacy and they will forward the refill request to us. Please allow 3 business days for your refill to be completed.          Additional Information About Your Visit        MyChart Information     [facility] gives you secure access to your electronic health record. If you see a primary care provider, you can also send messages to your care team and make appointments. If you have questions, please call your primary care clinic.  If  "you do not have a primary care provider, please call [phone] and they will assist you.        Care EveryWhere ID     This is your Care EveryWhere ID. This could be used by other organizations to access your [facility] medical records  [id number]        Your Vitals Were     Pulse Temperature Respirations Height Pulse Oximetry BMI (Body Mass Index)    71 97.8  F (36.6  C) (Tympanic) 18 1.619 m (5' 3.74\") 100% 21.35 kg/m2       Blood Pressure from Last 3 Encounters:   11/21/18 119/78   10/25/18 120/75   10/11/17 112/67    Weight from Last 3 Encounters:   11/21/18 56 kg (123 lb 6.4 oz)   10/25/18 55.3 kg (122 lb)              Today, you had the following     No orders found for display       Primary Care Provider Office Phone # Fax #    Sheree BROWN Love [phone]       [address] [address]        Equal Access to Services     SIDDHARTHA ZABALA : Hadii aad ku hadasho Soomaali, waaxda luqadaha, qaybta kaalmada adeegyada, raquel camejo hayvinicio hernandez . So [facility] [phone].    ATENCIÓN: Si habla español, tiene a guerrero disposición servicios gratuitos de asistencia lingüística. Llame al [phone].    We comply with applicable federal civil rights laws and [place] laws. We do not discriminate on the basis of race, color, national origin, age, disability, sex, sexual orientation, or gender identity.            Thank you!     Thank you for choosing [facility]  for your care. Our goal is always to provide you with excellent care. Hearing back from our patients is one way we can continue to improve our services. Please take a few minutes to complete the written survey that you may receive in the mail after your visit with us. Thank you!             Your Updated Medication List - Protect others around you: Learn how to safely use, store and throw away your medicines at www.disposemymeds.org.          This list is accurate as of " 11/21/18  9:22 AM.  Always use your most recent med list.                   Brand Name Dispense Instructions for use Diagnosis    etonogestrel-ethinyl estradiol 0.12-0.015 MG/24HR vaginal ring    NUVARING     Place 1 each vaginally every 28 days        FLUOXETINE HCL PO      Take 10 mg by mouth daily        HYDROXYZINE HCL PO      Take by mouth 3 times daily as needed for itching        MIRENA (52 MG) 20 MCG/24HR IUD   Generic drug:  levonorgestrel

## 2018-11-21 NOTE — LETTER
"11/21/2018       RE: Victoria Solares  [address]  [facility] [address]     Dear Colleague,    Thank you for referring your patient, Victoria Solares, to the [facility]. Please see a copy of my visit note below.    PROBLEM LIST:  1.  Thrombocytopenia, mild  2.  History of infectious mononucleosis, age 11  3.  Anxiety and depression.    Interim history: Ms. Solares is here for follow-up of thrombocytopenia.  She is accompanied by her father.  I last saw her on 10/25/18.  On that day extensive evaluation including von Willebrand panel, JAY, rheumatoid factor, hepatitis B, hepatitis C, HIV, TSH, B12, ferritin were all normal.  She had a platelet count of 111 with a mildly low total white count of 3.7 within normal neutrophil count of 1.9 x 10^9/L per liter, Normal hemoglobin.    Today she is feeling okay.  She reports that she had a upper respiratory infection lasting about 10 days, felt back to normal about 2 days ago.  She also had this episode where she developed some pain and swelling behind her left knee when evening.  It resolved by the next day.  She did show me a picture wearing her popliteal space it does look a bit erythematous and edematous.  No epistaxis, gum bleeding, melena, hematochezia, easy bruising.    PHYSICAL EXAMINATION:  /78 (BP Location: Right arm, Patient Position: Sitting, Cuff Size: Adult Regular)  Pulse 71  Temp 97.8  F (36.6  C) (Tympanic)  Resp 18  Ht 1.619 m (5' 3.74\")  Wt 56 kg (123 lb 6.4 oz)  SpO2 100%  BMI 21.35 kg/m2    General appearance:  Patient is 19 yo woman in no acute distress.     HEENT:  No pallor, icterus, or mucositis.  No thyromegaly.   Lymph nodes:  No cervical, supraclavicular, axillary, or inguinal lymphadenopathy.   Lungs:  Clear to auscultation bilaterally.   Heart:  Regular rate and rhythm; no S3 S4 or murmer.     Abdomen:  Positive bowel sounds, soft and nontender, nondistended.  No hepatomegaly. No splenomegaly appreciated.    Extremities: " No obvious swelling behind either knee.  No joint swelling or tenderness.  No ankle edema.     Skin: A few tiny cherry angiomas on right upper neck right arm.  No rash, no petechiae or ecchymoses.    Labs:  Results for SHIRA CASTILLO (MRN [id number]) as of 11/21/2018 08:58   Ref. Range 11/21/2018 08:13   WBC Latest Ref Range: 4.0 - 11.0 10e9/L 4.4   Hemoglobin Latest Ref Range: 11.7 - 15.7 g/dL 14.0   Hematocrit Latest Ref Range: 35.0 - 47.0 % 41.4   Platelet Count Latest Ref Range: 150 - 450 10e9/L 142 (L)   RBC Count Latest Ref Range: 3.8 - 5.2 10e12/L 4.55   MCV Latest Ref Range: 78 - 100 fl 91   MCH Latest Ref Range: 26.5 - 33.0 pg 30.8   MCHC Latest Ref Range: 31.5 - 36.5 g/dL 33.8   RDW Latest Ref Range: 10.0 - 15.0 % 12.3   Diff Method Unknown Automated Method   % Neutrophils Latest Units: % 38.9   % Lymphocytes Latest Units: % 49.4   % Monocytes Latest Units: % 8.7   % Eosinophils Latest Units: % 2.1   % Basophils Latest Units: % 0.9   % Immature Granulocytes Latest Units: % 0.0   Nucleated RBCs Latest Ref Range: 0 /100 0   Absolute Neutrophil Latest Ref Range: 1.6 - 8.3 10e9/L 1.7   Absolute Lymphocytes Latest Ref Range: 0.8 - 5.3 10e9/L 2.2   Absolute Monocytes Latest Ref Range: 0.0 - 1.3 10e9/L 0.4   Absolute Eosinophils Latest Ref Range: 0.0 - 0.7 10e9/L 0.1   Absolute Basophils Latest Ref Range: 0.0 - 0.2 10e9/L 0.0   Abs Immature Granulocytes Latest Ref Range: 0 - 0.4 10e9/L 0.0   Absolute Nucleated RBC Unknown 0.0     Results for SHIRA CASTILLO (MRN [id number]) as of 11/21/2018 14:07   Ref. Range 10/25/2018 08:25   Ferritin Latest Ref Range: 12 - 150 ng/mL 22   Rheumatoid Factor Latest Ref Range: <20 IU/mL <20   TSH Latest Ref Range: 0.40 - 4.00 mU/L 1.23   Vitamin B12 Latest Ref Range: 193 - 986 pg/mL 305   Results for SHIRA CASTILLO (MRN [id number]) as of 11/21/2018 14:07   Ref. Range 10/25/2018 08:23 10/25/2018 08:25   Factor 8 Assay Latest Ref Range: 55 - 200 %  106   von Willebrand Antigen Latest  Ref Range: 50 - 200 %  122   von Willebrand Factor Activity Latest Ref Range: 50 - 180 % 100    Results for SHIRA CASTILLO (MRN [id number]) as of 11/21/2018 14:07   Ref. Range 10/25/2018 08:25   Hep B Surface Agn Latest Ref Range: NR^Nonreactive  Nonreactive   Hepatitis B Surface Antibody Latest Ref Range: <8.00 m[IU]/mL 53.47 (H)   Hepatitis B Core Edith Latest Ref Range: NR^Nonreactive  Nonreactive   Hepatitis C Antibody Latest Ref Range: NR^Nonreactive  Nonreactive   HIV Antigen Antibody Combo Latest Ref Range: NR^Nonreactive     Nonreactive     JAY interpretation Negative  NEG^Negative  Final 10/25/2018  8:25 AM 51      Comment:                                        Reference range:   <1:40  NEGATIVE   1:40 - 1:80  BORDERLINE POSITIVE   >1:80 POSITIVE   Patient Name: SHIRA CASTILLO   MR#: [id number]   Specimen #: [id number]   Collected: 10/25/2018   Received: 10/25/2018   Reported: 10/25/2018 16:51   Ordering Phy(s): ALFRED RAMON     For improved result formatting, select 'View Enhanced Report Format' under    Linked Documents section.     TEST(S):   Blood Smear Morphology     FINAL DIAGNOSIS:   Peripheral blood smear:        Slight leukopenia        Thrombocytopenia with overall normal platelet morphology     I have personally reviewed all specimens and/or slides, including the   listed special stains, and used them   with my medical judgment to determine the final diagnosis.     Electronically signed out by:     Laz Starkey M.D.,[facility]     Technical testing/processing performed at [place], [place]     CLINICAL HISTORY:   18-year-old female with thrombocytopenia     CLINICAL LAB RESULTS:   Battery Order No. Lab Test Code Clinical Result Ref. Range Units Result   Date   Hemogram/Diff/PLT Q52469  WBC Count L 3.7 4.0-11.0 10e9/L 10/25/2018   08:33        RBC Count 4.18 3.8-5.2 10e12/L 10/25/2018 08:33        Hemoglobin 13.0 11.7-15.7  g/dL 10/25/2018 08:33        Hematocrit 37.7 35.0-47.0 % 10/25/2018 08:33        MCV 90  fl 10/25/2018 08:33        MCH 31.1 26.5-33.0 pg 10/25/2018 08:33        MCHC 34.5 31.5-36.5 g/dL 10/25/2018 08:33        RDW 12.4 10.0-15.0 % 10/25/2018 08:33        Platelet Count L 111 150-450 10e9/L 10/25/2018 08:33         SEE TEXT   10/25/2018 08:33        Text/Comments:   Automated Method        % Neutrophils 51.1  % 10/25/2018 08:33        % Lymphocytes 36.3  % 10/25/2018 08:33        % Monocytes 9.9  % 10/25/2018 08:33        % Eosinophils 1.9  % 10/25/2018 08:33        % Basophils 0.5  % 10/25/2018 08:33        % Immature Grans 0.3  % 10/25/2018 08:33        Nucleated RBCs 0 0 /100 10/25/2018 08:33        abs Neutrophils 1.9 1.6-8.3 10e9/L 10/25/2018 08:33        abs Lymphocytes 1.4 0.8-5.3 10e9/L 10/25/2018 08:33        abs Monocytes 0.4 0.0-1.3 10e9/L 10/25/2018 08:33        abs Eosinophils 0.1 0.0-0.7 10e9/L 10/25/2018 08:33        abs Basophils 0.0 0.0-0.2 10e9/L 10/25/2018 08:33        abs Imm Granulocytes 0.0 0-0.4 10e9/L 10/25/2018 08:33        abs NRBC 0.0   10/25/2018 08:33     Retic   Retic abs 56.4 25-95 10e9/L 10/25/2018 08:33     MICROSCOPIC DESCRIPTION:   Peripheral Blood   The red cells appear normochromic.  Poikilocytosis is minimal.     Polychromasia is not increased.  Rouleaux   formation is not increased. The morphology of the platelets is normal.     CPT Codes:   A: 48141-ZWKKU     TESTING LAB LOCATION:   [facility], [address]   [id number]     Assessment recommendation:  1.  Mild thrombocytopenia- extensive evaluation has been unrevealing.  I suspect that this is due to mild autoimmune thrombocytopenia (ITP) or suppression from a self-limited viral syndrome.  I discussed that these are diagnoses of exclusion.  She has had numerous tests with none of them pointing to anything seriously wrong.  Her  hemoglobin and white count are normal, so it is unlikely that she has a primary bone marrow disorder.  It is also unlikely that she has a serious autoimmune condition, such as lupus in the absence of other symptoms.  Since the platelet count has not completely resolved , I will have her follow-up with a repeat CBC with differential and see me in clinic in about 3 months.  She should contact me if she is having any bleeding symptoms or other concerns regarding her platelets.  2.  History of lump behind knee -this was likely a Baker's cyst, may have appeared red due to her rubbing on it.  There is no abnormality now.  I do not think it is in any way related to the mild thrombocytopenia.    Alesia Henry MD  Hematology

## 2018-12-27 DIAGNOSIS — D69.6 THROMBOCYTOPENIA (H): ICD-10-CM

## 2018-12-27 LAB
BASOPHILS # BLD AUTO: 0 10E9/L (ref 0–0.2)
BASOPHILS NFR BLD AUTO: 0.5 %
DIFFERENTIAL METHOD BLD: ABNORMAL
EOSINOPHIL # BLD AUTO: 0.1 10E9/L (ref 0–0.7)
EOSINOPHIL NFR BLD AUTO: 1.1 %
ERYTHROCYTE [DISTWIDTH] IN BLOOD BY AUTOMATED COUNT: 13.4 % (ref 10–15)
FERRITIN SERPL-MCNC: 30 NG/ML (ref 12–150)
HCT VFR BLD AUTO: 40.6 % (ref 35–47)
HGB BLD-MCNC: 14.1 G/DL (ref 11.7–15.7)
IMM GRANULOCYTES # BLD: 0 10E9/L (ref 0–0.4)
IMM GRANULOCYTES NFR BLD: 0.2 %
LYMPHOCYTES # BLD AUTO: 1.6 10E9/L (ref 0.8–5.3)
LYMPHOCYTES NFR BLD AUTO: 25.8 %
MCH RBC QN AUTO: 31.8 PG (ref 26.5–33)
MCHC RBC AUTO-ENTMCNC: 34.7 G/DL (ref 31.5–36.5)
MCV RBC AUTO: 91 FL (ref 78–100)
MONOCYTES # BLD AUTO: 0.5 10E9/L (ref 0–1.3)
MONOCYTES NFR BLD AUTO: 7.3 %
NEUTROPHILS # BLD AUTO: 4 10E9/L (ref 1.6–8.3)
NEUTROPHILS NFR BLD AUTO: 65.1 %
NRBC # BLD AUTO: 0 10*3/UL
NRBC BLD AUTO-RTO: 0 /100
PLATELET # BLD AUTO: 130 10E9/L (ref 150–450)
RBC # BLD AUTO: 4.44 10E12/L (ref 3.8–5.2)
WBC # BLD AUTO: 6.2 10E9/L (ref 4–11)

## 2019-02-11 ENCOUNTER — NURSE TRIAGE (OUTPATIENT)
Dept: NURSING | Facility: CLINIC | Age: 19
End: 2019-02-11

## 2019-02-11 NOTE — TELEPHONE ENCOUNTER
Caller wants a sick note for [employer] class  Advised to check student  Web site @ [facility] for instructions regarding same   understands  and will comply   Felecia Arenas RN  FNA      Additional Information    General information question, no triage required and triager able to answer question    Protocols used: INFORMATION ONLY CALL-ADULT-

## 2019-02-19 DIAGNOSIS — D69.6 THROMBOCYTOPENIA (H): Primary | ICD-10-CM

## 2019-02-20 ENCOUNTER — ONCOLOGY VISIT (OUTPATIENT)
Dept: ONCOLOGY | Facility: CLINIC | Age: 19
End: 2019-02-20
Attending: INTERNAL MEDICINE
Payer: COMMERCIAL

## 2019-02-20 VITALS
TEMPERATURE: 97.1 F | OXYGEN SATURATION: 97 % | SYSTOLIC BLOOD PRESSURE: 123 MMHG | BODY MASS INDEX: 21.17 KG/M2 | HEART RATE: 82 BPM | RESPIRATION RATE: 18 BRPM | DIASTOLIC BLOOD PRESSURE: 74 MMHG | WEIGHT: 124 LBS | HEIGHT: 64 IN

## 2019-02-20 DIAGNOSIS — D69.6 THROMBOCYTOPENIA (H): Primary | ICD-10-CM

## 2019-02-20 DIAGNOSIS — D69.6 THROMBOCYTOPENIA (H): ICD-10-CM

## 2019-02-20 LAB
BASOPHILS # BLD AUTO: 0.1 10E9/L (ref 0–0.2)
BASOPHILS NFR BLD AUTO: 1.3 %
DIFFERENTIAL METHOD BLD: ABNORMAL
EOSINOPHIL # BLD AUTO: 0.1 10E9/L (ref 0–0.7)
EOSINOPHIL NFR BLD AUTO: 1.8 %
ERYTHROCYTE [DISTWIDTH] IN BLOOD BY AUTOMATED COUNT: 12.5 % (ref 10–15)
HCT VFR BLD AUTO: 43.8 % (ref 35–47)
HGB BLD-MCNC: 14.6 G/DL (ref 11.7–15.7)
IMM GRANULOCYTES # BLD: 0 10E9/L (ref 0–0.4)
IMM GRANULOCYTES NFR BLD: 0 %
LYMPHOCYTES # BLD AUTO: 1.7 10E9/L (ref 0.8–5.3)
LYMPHOCYTES NFR BLD AUTO: 45.4 %
MCH RBC QN AUTO: 30.7 PG (ref 26.5–33)
MCHC RBC AUTO-ENTMCNC: 33.3 G/DL (ref 31.5–36.5)
MCV RBC AUTO: 92 FL (ref 78–100)
MONOCYTES # BLD AUTO: 0.3 10E9/L (ref 0–1.3)
MONOCYTES NFR BLD AUTO: 7.7 %
NEUTROPHILS # BLD AUTO: 1.7 10E9/L (ref 1.6–8.3)
NEUTROPHILS NFR BLD AUTO: 43.8 %
NRBC # BLD AUTO: 0 10*3/UL
NRBC BLD AUTO-RTO: 0 /100
PLATELET # BLD AUTO: 154 10E9/L (ref 150–450)
RBC # BLD AUTO: 4.75 10E12/L (ref 3.8–5.2)
WBC # BLD AUTO: 3.8 10E9/L (ref 4–11)

## 2019-02-20 PROCEDURE — 36415 COLL VENOUS BLD VENIPUNCTURE: CPT | Performed by: INTERNAL MEDICINE

## 2019-02-20 PROCEDURE — 99213 OFFICE O/P EST LOW 20 MIN: CPT | Mod: GC | Performed by: INTERNAL MEDICINE

## 2019-02-20 PROCEDURE — G0463 HOSPITAL OUTPT CLINIC VISIT: HCPCS | Mod: ZF

## 2019-02-20 PROCEDURE — 85025 COMPLETE CBC W/AUTO DIFF WBC: CPT | Performed by: INTERNAL MEDICINE

## 2019-02-20 ASSESSMENT — PAIN SCALES - GENERAL: PAINLEVEL: NO PAIN (0)

## 2019-02-20 ASSESSMENT — MIFFLIN-ST. JEOR: SCORE: 1313.33

## 2019-02-20 NOTE — PROGRESS NOTES
"Hematology clinic note  Day of the visit: 2/20/2019  PROBLEM LIST:  1.  Thrombocytopenia, mild  2.  History of infectious mononucleosis, age 11  3.  Anxiety and depression.    Interim history: Ms. Solares is here for follow-up of thrombocytopenia.  She is accompanied by her mother. During the previous visits extensive evaluation  including von Willebrand panel, JAY, rheumatoid factor, hepatitis B, hepatitis C, HIV, TSH, B12, ferritin was done and they were normal.  Today she is feeling okay.  She reports that she has URI, no subjective fever however. Positive sick contacts. No epistaxis, gum bleeding, melena, hematochezia, easy bruising.    ROS: Review of her systems are negative except as above.     PHYSICAL EXAMINATION:  /74 (BP Location: Left arm, Patient Position: Sitting, Cuff Size: Adult Regular)   Pulse 82   Temp 97.1  F (36.2  C) (Tympanic)   Resp 18   Ht 1.619 m (5' 3.74\")   Wt 56.2 kg (124 lb)   SpO2 97%   BMI 21.46 kg/m      General appearance:  Patient is 17 yo woman in no acute distress.     HEENT:  No pallor, icterus, or mucositis.  No thyromegaly.   Lymph nodes:  No cervical, supraclavicular, axillary, or inguinal lymphadenopathy.   Lungs:  Clear to auscultation bilaterally.   Heart:  Regular rate and rhythm; no S3 S4 or murmer.     Abdomen:  Positive bowel sounds, soft and nontender, nondistended.  No hepatomegaly. No splenomegaly appreciated.    Extremities: No obvious swelling behind either knee.  No joint swelling or tenderness.  No ankle edema.     Skin: A few tiny cherry angiomas on right upper neck right arm.  No rash, no petechiae or ecchymoses.    Lab Results   Component Value Date    WBC 3.8 02/20/2019     Lab Results   Component Value Date    RBC 4.75 02/20/2019     Lab Results   Component Value Date    HGB 14.6 02/20/2019     Lab Results   Component Value Date    HCT 43.8 02/20/2019     No components found for: MCT  Lab Results   Component Value Date    MCV 92 02/20/2019 "     Lab Results   Component Value Date    MCH 30.7 02/20/2019     Lab Results   Component Value Date    MCHC 33.3 02/20/2019     Lab Results   Component Value Date    RDW 12.5 02/20/2019     Lab Results   Component Value Date     02/20/2019     Last Comprehensive Metabolic Panel:  Sodium   Date Value Ref Range Status   10/11/2017 143 133 - 144 mmol/L Final     Potassium   Date Value Ref Range Status   10/11/2017 3.5 3.4 - 5.3 mmol/L Final     Chloride   Date Value Ref Range Status   10/11/2017 112 (H) 96 - 110 mmol/L Final     Carbon Dioxide   Date Value Ref Range Status   10/11/2017 20 20 - 32 mmol/L Final     Anion Gap   Date Value Ref Range Status   10/11/2017 11 3 - 14 mmol/L Final     Glucose   Date Value Ref Range Status   10/11/2017 89 70 - 99 mg/dL Final     Urea Nitrogen   Date Value Ref Range Status   10/11/2017 10 7 - 19 mg/dL Final     Creatinine   Date Value Ref Range Status   10/11/2017 0.72 0.50 - 1.00 mg/dL Final     GFR Estimate   Date Value Ref Range Status   10/11/2017 >90 >60 mL/min/1.7m2 Final     Comment:     Non  GFR Calc     Calcium   Date Value Ref Range Status   10/11/2017 8.6 (L) 9.1 - 10.3 mg/dL Final         Assessment recommendation:  18 years old female with h/o thrombocytopenia and extensive negative work up, who is here to follow up with CBC showing normal Pt count today.    1.  Mild thrombocytopenia- extensive evaluation has been unrevealing.  She is not thrombocytopenic today. Her hemoglobin is normal, slightly leukopenic (but not neutrophilic); so it is unlikely that she has a primary bone marrow disorder.  It is also unlikely that she has a serious autoimmune condition in the absence of other symptoms.  Platelets has improved form previous visit. No need for further work up; no need for for follow up.    We had a long discussion with patient and her mother, explaining the previous episode of thrombocytopenia. We answered to her questions (she was concerned  about hematologic malignancy predisposing in thrombocytopenia), explained about possible causes.    We recommend against doing routine CBCs. Check only if a clear clinical indication- eg fever, unexplained bleeding.     Patient was seen and discussed with Dr. Henry.      Paradise Maharaj MD  Hem/Onc fellow    Attending Note:  I have reviewed the patient chart, and interviewed and examined the patient.  I agree with the assessment and plan.  Alesia Henry MD  Hematology

## 2019-02-20 NOTE — NURSING NOTE
"Oncology Rooming Note    February 20, 2019 8:42 AM   Victoria Solares is a 18 year old female who presents for:    Chief Complaint   Patient presents with     Oncology Clinic Visit     Visit related to anemia     Initial Vitals: /74 (BP Location: Left arm, Patient Position: Sitting, Cuff Size: Adult Regular)   Pulse 82   Temp 97.1  F (36.2  C) (Tympanic)   Resp 18   Ht 1.619 m (5' 3.74\")   Wt 56.2 kg (124 lb)   SpO2 97%   BMI 21.46 kg/m   Estimated body mass index is 21.46 kg/m  as calculated from the following:    Height as of this encounter: 1.619 m (5' 3.74\").    Weight as of this encounter: 56.2 kg (124 lb). Body surface area is 1.59 meters squared.  No Pain (0) Comment: Data Unavailable   No LMP recorded.  Allergies reviewed: Yes  Medications reviewed: Yes    Medications: Medication refills not needed today.  Pharmacy name entered into EPIC: Data Unavailable    Clinical concerns: No new concerns. Provider was notified.      Maureen Parekh LPN            "

## 2019-02-20 NOTE — LETTER
"2/20/2019       RE: Victoria Solares  [address]  [facility] [address]     Dear Colleague,    Thank you for referring your patient, Victoria Solares, to the [facility]. Please see a copy of my visit note below.    Hematology clinic note  Day of the visit: 2/20/2019  PROBLEM LIST:  1.  Thrombocytopenia, mild  2.  History of infectious mononucleosis, age 11  3.  Anxiety and depression.    Interim history: Ms. Solares is here for follow-up of thrombocytopenia.  She is accompanied by her mother. During the previous visits extensive evaluation  including von Willebrand panel, JAY, rheumatoid factor, hepatitis B, hepatitis C, HIV, TSH, B12, ferritin was done and they were normal.  Today she is feeling okay.  She reports that she has URI, no subjective fever however. Positive sick contacts. No epistaxis, gum bleeding, melena, hematochezia, easy bruising.    ROS: Review of her systems are negative except as above.     PHYSICAL EXAMINATION:  /74 (BP Location: Left arm, Patient Position: Sitting, Cuff Size: Adult Regular)   Pulse 82   Temp 97.1  F (36.2  C) (Tympanic)   Resp 18   Ht 1.619 m (5' 3.74\")   Wt 56.2 kg (124 lb)   SpO2 97%   BMI 21.46 kg/m       General appearance:  Patient is 19 yo woman in no acute distress.     HEENT:  No pallor, icterus, or mucositis.  No thyromegaly.   Lymph nodes:  No cervical, supraclavicular, axillary, or inguinal lymphadenopathy.   Lungs:  Clear to auscultation bilaterally.   Heart:  Regular rate and rhythm; no S3 S4 or murmer.     Abdomen:  Positive bowel sounds, soft and nontender, nondistended.  No hepatomegaly. No splenomegaly appreciated.    Extremities: No obvious swelling behind either knee.  No joint swelling or tenderness.  No ankle edema.     Skin: A few tiny cherry angiomas on right upper neck right arm.  No rash, no petechiae or ecchymoses.    Lab Results   Component Value Date    WBC 3.8 02/20/2019     Lab Results   Component Value Date    RBC 4.75 " 02/20/2019     Lab Results   Component Value Date    HGB 14.6 02/20/2019     Lab Results   Component Value Date    HCT 43.8 02/20/2019     No components found for: MCT  Lab Results   Component Value Date    MCV 92 02/20/2019     Lab Results   Component Value Date    MCH 30.7 02/20/2019     Lab Results   Component Value Date    MCHC 33.3 02/20/2019     Lab Results   Component Value Date    RDW 12.5 02/20/2019     Lab Results   Component Value Date     02/20/2019     Last Comprehensive Metabolic Panel:  Sodium   Date Value Ref Range Status   10/11/2017 143 133 - 144 mmol/L Final     Potassium   Date Value Ref Range Status   10/11/2017 3.5 3.4 - 5.3 mmol/L Final     Chloride   Date Value Ref Range Status   10/11/2017 112 (H) 96 - 110 mmol/L Final     Carbon Dioxide   Date Value Ref Range Status   10/11/2017 20 20 - 32 mmol/L Final     Anion Gap   Date Value Ref Range Status   10/11/2017 11 3 - 14 mmol/L Final     Glucose   Date Value Ref Range Status   10/11/2017 89 70 - 99 mg/dL Final     Urea Nitrogen   Date Value Ref Range Status   10/11/2017 10 7 - 19 mg/dL Final     Creatinine   Date Value Ref Range Status   10/11/2017 0.72 0.50 - 1.00 mg/dL Final     GFR Estimate   Date Value Ref Range Status   10/11/2017 >90 >60 mL/min/1.7m2 Final     Comment:     Non  GFR Calc     Calcium   Date Value Ref Range Status   10/11/2017 8.6 (L) 9.1 - 10.3 mg/dL Final         Assessment recommendation:  18 years old female with h/o thrombocytopenia and extensive negative work up, who is here to follow up with CBC showing normal Pt count today.    1.  Mild thrombocytopenia- extensive evaluation has been unrevealing.  She is not thrombocytopenic today. Her hemoglobin is normal, slightly leukopenic (but not neutrophilic); so it is unlikely that she has a primary bone marrow disorder.  It is also unlikely that she has a serious autoimmune condition in the absence of other symptoms.  Platelets has improved form  previous visit. No need for further work up; no need for for follow up.    We had a long discussion with patient and her mother, explaining the previous episode of thrombocytopenia. We answered to her questions (she was concerned about hematologic malignancy predisposing in thrombocytopenia), explained about possible causes.    We recommend against doing routine CBCs. Check only if a clear clinical indication- eg fever, unexplained bleeding.     Patient was seen and discussed with Dr. Henry.      Paradise Maharaj MD  Hem/Onc fellow    Attending Note:  I have reviewed the patient chart, and interviewed and examined the patient.  I agree with the assessment and plan.    Again, thank you for allowing me to participate in the care of your patient.      Sincerely,    Alesia Henry MD

## 2020-03-11 ENCOUNTER — HEALTH MAINTENANCE LETTER (OUTPATIENT)
Age: 20
End: 2020-03-11

## 2020-09-26 ENCOUNTER — VIRTUAL VISIT (OUTPATIENT)
Dept: FAMILY MEDICINE | Facility: OTHER | Age: 20
End: 2020-09-26
Payer: COMMERCIAL

## 2020-09-26 DIAGNOSIS — Z20.822 SUSPECTED COVID-19 VIRUS INFECTION: Primary | ICD-10-CM

## 2020-09-26 PROCEDURE — 99421 OL DIG E/M SVC 5-10 MIN: CPT | Performed by: PHYSICIAN ASSISTANT

## 2020-09-26 NOTE — PROGRESS NOTES
"Date: 2020 12:33:01  Clinician: Daquan Holcomb  Clinician NPI: [id number]  Patient: Victoria Crowder  Patient : 2000  Patient Address: [address] [place] [address]  Patient Phone: [phone]  Visit Protocol: URI  Patient Summary:  Victoria is a 19 year old ( : 2000 ) female who initiated a [facility] Visit for COVID-19 (Coronavirus) evaluation and screening. When asked the question \"Please sign me up to receive news, health information and promotions from [facility].\", Victoria responded \"No\".    Victoria states her symptoms started gradually 5-6 days ago. After her symptoms started, they improved and then got worse again.   Her symptoms consist of a cough, nasal congestion, rhinitis, facial pain or pressure, malaise, and a sore throat.   Symptom details     Nasal secretions: The color of her mucus is clear and yellow.    Cough: Victoria coughs a few times an hour and her cough is more bothersome at night. Phlegm does not come into her throat when she coughs. She believes her cough is caused by post-nasal drip.     Sore throat: Victoria reports having mild throat pain (1-3 on a 10 point pain scale), does not have exudate on her tonsils, and can swallow liquids. She is not sure if the lymph nodes in her neck are enlarged. A rash has not appeared on the skin since the sore throat started.     Facial pain or pressure: The facial pain or pressure feels worse when bending over or leaning forward.      Victoria denies having headache, ear pain, anosmia, vomiting, nausea, wheezing, fever, myalgias, chills, teeth pain, ageusia, and diarrhea. She also denies having a sinus infection within the past year, taking antibiotic medication in the past month, and having recent facial or sinus surgery in the past 60 days. She is not experiencing dyspnea.   Precipitating events  Within the past week, Victoria has not been exposed to someone with strep throat. She has not recently been exposed to someone with influenza. Victoria " "has been in close contact with the following high risk individuals: immunocompromised people.   Pertinent COVID-19 (Coronavirus) information  In the past 14 days, Victoria has not worked in a congregate living setting.   She does not work or volunteer as healthcare worker or a  and does not work or volunteer in a healthcare facility.   Victoria also has not lived in a congregate living setting in the past 14 days. She does not live with a healthcare worker.   Victoria has not had a close contact with a laboratory-confirmed COVID-19 patient within 14 days of symptom onset.   Since December 2019, Victoria and has had upper respiratory infection (URI) or influenza-like illness. Has not been diagnosed with lab-confirmed COVID-19 test      Date(s) of previous URI or influenza-like illness (free-text): Around Jan 25 - Feb 3     Symptoms Victoria experienced during previous URI or influenza-like illness as reported by the patient (free-text): tested positive for Influenza A        Pertinent medical history  Victoria does not get yeast infections when she takes antibiotics.   Victoria needs a return to work/school note.   Weight: 130 lbs   Victoria does not smoke or use smokeless tobacco.   She denies pregnancy and denies breastfeeding. She has menstruated in the past month.   Weight: 130 lbs  Reason for repeat visit for the same protocol within 24 hours:  I accidentally pressed \"yes\" to the rash question. It is not allowing me to continue because of this. I need to start the eval over  See the History of referred by protocol and completed visits section for details on previous visits (visits currently in queue to be diagnosed will not appear in this section).    MEDICATIONS: Mirena intrauterine, hydroxyzine HCl oral, fluoxetine oral, ALLERGIES: NKDA  Clinician Response:  Dear Victoria,   Your symptoms show that you may have coronavirus (COVID-19). This illness can cause fever, cough and trouble breathing. Many people get a mild case " "and get better on their own. Some people can get very sick.  What should I do?  We would like to test you for this virus.   1. Please call [phone] to schedule your visit. Explain that you were referred by [facility] to have a COVID-19 test. Be ready to share your [facility] visit ID number.  The following will serve as your written order for this COVID Test, ordered by me, for the indication of suspected COVID [Z20.828]: The test will be ordered in [facility], our electronic health record, after you are scheduled. It will show as ordered and authorized by Orlin Dior MD.  Order: COVID-19 (Coronavirus) PCR for SYMPTOMATIC testing from [facility].      2. When it's time for your COVID test:  Stay at least 6 feet away from others. (If someone will drive you to your test, stay in the backseat, as far away from the  as you can.)   Cover your mouth and nose with a mask, tissue or washcloth.  Go straight to the testing site. Don't make any stops on the way there or back.      3.Starting now: Stay home and away from others (self-isolate) until:   You've had no fever---and no medicine that reduces fever---for one full day (24 hours). And...   Your other symptoms have gotten better. For example, your cough or breathing has improved. And...   At least 10 days have passed since your symptoms started.       During this time, don't leave the house except for testing or medical care.   Stay in your own room, even for meals. Use your own bathroom if you can.   Stay away from others in your home. No hugging, kissing or shaking hands. No visitors.  Don't go to work, school or anywhere else.    Clean \"high touch\" surfaces often (doorknobs, counters, handles, etc.). Use a household cleaning spray or wipes. You'll find a full list of  on the EPA website: www.epa.gov/pesticide-registration/list-n-disinfectants-use-against-sars-cov-2.   Cover your mouth and nose with a mask, tissue or washcloth to avoid spreading germs.  Wash your hands " and face often. Use soap and water.  Caregivers in these groups are at risk for severe illness due to COVID-19:  o People 65 years and older  o People who live in a nursing home or long-term care facility  o People with chronic disease (lung, heart, cancer, diabetes, kidney, liver, immunologic)  o People who have a weakened immune system, including those who:   Are in cancer treatment  Take medicine that weakens the immune system, such as corticosteroids  Had a bone marrow or organ transplant  Have an immune deficiency  Have poorly controlled HIV or AIDS  Are obese (body mass index of 40 or higher)  Smoke regularly   o Caregivers should wear gloves while washing dishes, handling laundry and cleaning bedrooms and bathrooms.  o Use caution when washing and drying laundry: Don't shake dirty laundry, and use the warmest water setting that you can.  o For more tips, go to www.cdc.gov/coronavirus/2019-ncov/downloads/10Things.pdf.    4.Sign up for [employer]. We know it's scary to hear that you might have COVID-19. We want to track your symptoms to make sure you're okay over the next 2 weeks. Please look for an email from [employer]---this is a free, online program that we'll use to keep in touch. To sign up, follow the link in the email. Learn more at [email or web address]  How can I take care of myself?   Get lots of rest. Drink extra fluids (unless a doctor has told you not to).   Take Tylenol (acetaminophen) for fever or pain. If you have liver or kidney problems, ask your family doctor if it's okay to take Tylenol.   Adults can take either:    650 mg (two 325 mg pills) every 4 to 6 hours, or...   1,000 mg (two 500 mg pills) every 8 hours as needed.    Note: Don't take more than 3,000 mg in one day. Acetaminophen is found in many medicines (both prescribed and over-the-counter medicines). Read all labels to be sure you don't take too much.   For children, check the Tylenol bottle for the right dose. The  dose is based on the child's age or weight.    If you have other health problems (like cancer, heart failure, an organ transplant or severe kidney disease): Call your specialty clinic if you don't feel better in the next 2 days.       Know when to call 911. Emergency warning signs include:    Trouble breathing or shortness of breath Pain or pressure in the chest that doesn't go away Feeling confused like you haven't felt before, or not being able to wake up Bluish-colored lips or face.  Where can I get more information?   [facility] -- About COVID-19: [email or web address]   CDC -- What to Do If You're Sick: www.cdc.gov/coronavirus/2019-ncov/about/steps-when-sick.html   [facility] -- Ending Home Isolation: www.cdc.gov/coronavirus/2019-ncov/hcp/disposition-in-home-patients.html   [facility] -- Caring for Someone: www.cdc.gov/coronavirus/2019-ncov/if-you-are-sick/care-for-someone.html   [facility] -- Interim Guidance for Hospital Discharge to Home: www.[facility].[place]./diseases/coronavirus/hcp/hospdischarge.pdf   [facility] clinical trials (COVID-19 research studies): [email or web address].[email or web address]-clinical-trials    Below are the COVID-19 hotlines at the [place] [facility] ([facility]). Interpreters are available.    For health questions: Call 410-606-9721 or 1-540.327.2287 (7 a.m. to 7 p.m.) For questions about schools and childcare: Call 694-588-2160 or 1-332.867.8159 (7 a.m. to 7 p.m.)    Diagnosis: Acute upper respiratory infection, unspecified  Diagnosis ICD: J06.9  Additional Clinician Notes:   Influenza can take several weeks to resolve.&nbsp; If your symptoms are not improving, or your symptoms are worsening, please come to one of our urgent care locations for further evaluation.

## 2020-09-27 DIAGNOSIS — Z20.822 SUSPECTED COVID-19 VIRUS INFECTION: ICD-10-CM

## 2020-09-27 PROCEDURE — U0003 INFECTIOUS AGENT DETECTION BY NUCLEIC ACID (DNA OR RNA); SEVERE ACUTE RESPIRATORY SYNDROME CORONAVIRUS 2 (SARS-COV-2) (CORONAVIRUS DISEASE [COVID-19]), AMPLIFIED PROBE TECHNIQUE, MAKING USE OF HIGH THROUGHPUT TECHNOLOGIES AS DESCRIBED BY CMS-2020-01-R: HCPCS | Performed by: FAMILY MEDICINE

## 2020-09-28 LAB
SARS-COV-2 RNA SPEC QL NAA+PROBE: NOT DETECTED
SPECIMEN SOURCE: NORMAL

## 2020-12-27 ENCOUNTER — HEALTH MAINTENANCE LETTER (OUTPATIENT)
Age: 20
End: 2020-12-27

## 2021-04-25 ENCOUNTER — HEALTH MAINTENANCE LETTER (OUTPATIENT)
Age: 21
End: 2021-04-25

## 2021-10-09 ENCOUNTER — HEALTH MAINTENANCE LETTER (OUTPATIENT)
Age: 21
End: 2021-10-09

## 2022-02-11 PROCEDURE — 87070 CULTURE OTHR SPECIMN AEROBIC: CPT | Mod: ORL | Performed by: PHYSICIAN ASSISTANT

## 2022-02-11 PROCEDURE — 87205 SMEAR GRAM STAIN: CPT | Mod: ORL | Performed by: PHYSICIAN ASSISTANT

## 2022-02-14 ENCOUNTER — LAB REQUISITION (OUTPATIENT)
Dept: LAB | Facility: CLINIC | Age: 22
End: 2022-02-14
Payer: COMMERCIAL

## 2022-02-14 DIAGNOSIS — L30.8 OTHER SPECIFIED DERMATITIS: ICD-10-CM

## 2022-02-14 LAB
GRAM STAIN RESULT: ABNORMAL
GRAM STAIN RESULT: ABNORMAL

## 2022-02-16 LAB — BACTERIA SPEC CULT: NORMAL

## 2022-05-21 ENCOUNTER — HEALTH MAINTENANCE LETTER (OUTPATIENT)
Age: 22
End: 2022-05-21

## 2022-06-24 ENCOUNTER — TELEPHONE (OUTPATIENT)
Dept: BEHAVIORAL HEALTH | Facility: CLINIC | Age: 22
End: 2022-06-24

## 2022-09-17 ENCOUNTER — HEALTH MAINTENANCE LETTER (OUTPATIENT)
Age: 22
End: 2022-09-17

## 2023-03-31 ENCOUNTER — HOSPITAL ENCOUNTER (EMERGENCY)
Facility: CLINIC | Age: 23
Discharge: HOME | End: 2023-03-31
Attending: EMERGENCY MEDICINE | Admitting: EMERGENCY MEDICINE
Payer: COMMERCIAL

## 2023-03-31 VITALS
SYSTOLIC BLOOD PRESSURE: 120 MMHG | HEART RATE: 85 BPM | DIASTOLIC BLOOD PRESSURE: 71 MMHG | OXYGEN SATURATION: 95 % | RESPIRATION RATE: 16 BRPM | HEIGHT: 63 IN | WEIGHT: 117 LBS | BODY MASS INDEX: 20.73 KG/M2 | TEMPERATURE: 98.9 F

## 2023-03-31 DIAGNOSIS — R19.7 DIARRHEA, UNSPECIFIED TYPE: ICD-10-CM

## 2023-03-31 DIAGNOSIS — R55 SYNCOPE, UNSPECIFIED SYNCOPE TYPE: ICD-10-CM

## 2023-03-31 LAB
ALBUMIN SERPL BCG-MCNC: 5.1 G/DL (ref 3.5–5.2)
ALP SERPL-CCNC: 53 U/L (ref 35–104)
ALT SERPL W P-5'-P-CCNC: 17 U/L (ref 10–35)
ANION GAP SERPL CALCULATED.3IONS-SCNC: 16 MMOL/L (ref 7–15)
AST SERPL W P-5'-P-CCNC: 22 U/L (ref 10–35)
ATRIAL RATE - MUSE: 78 BPM
BASOPHILS # BLD AUTO: 0.1 10E3/UL (ref 0–0.2)
BASOPHILS NFR BLD AUTO: 0 %
BILIRUB SERPL-MCNC: 1 MG/DL
BUN SERPL-MCNC: 15.2 MG/DL (ref 6–20)
CALCIUM SERPL-MCNC: 9.6 MG/DL (ref 8.6–10)
CHLORIDE SERPL-SCNC: 107 MMOL/L (ref 98–107)
CREAT SERPL-MCNC: 0.7 MG/DL (ref 0.51–0.95)
DEPRECATED HCO3 PLAS-SCNC: 19 MMOL/L (ref 22–29)
DIASTOLIC BLOOD PRESSURE - MUSE: NORMAL MMHG
EOSINOPHIL # BLD AUTO: 0.1 10E3/UL (ref 0–0.7)
EOSINOPHIL NFR BLD AUTO: 1 %
ERYTHROCYTE [DISTWIDTH] IN BLOOD BY AUTOMATED COUNT: 12.3 % (ref 10–15)
GFR SERPL CREATININE-BSD FRML MDRD: >90 ML/MIN/1.73M2
GLUCOSE SERPL-MCNC: 124 MG/DL (ref 70–99)
HCG SERPL QL: NEGATIVE
HCT VFR BLD AUTO: 46 % (ref 35–47)
HGB BLD-MCNC: 15.7 G/DL (ref 11.7–15.7)
IMM GRANULOCYTES # BLD: 0 10E3/UL
IMM GRANULOCYTES NFR BLD: 0 %
INTERPRETATION ECG - MUSE: NORMAL
LYMPHOCYTES # BLD AUTO: 0.6 10E3/UL (ref 0.8–5.3)
LYMPHOCYTES NFR BLD AUTO: 5 %
MCH RBC QN AUTO: 31.2 PG (ref 26.5–33)
MCHC RBC AUTO-ENTMCNC: 34.1 G/DL (ref 31.5–36.5)
MCV RBC AUTO: 91 FL (ref 78–100)
MONOCYTES # BLD AUTO: 0.5 10E3/UL (ref 0–1.3)
MONOCYTES NFR BLD AUTO: 5 %
NEUTROPHILS # BLD AUTO: 10.7 10E3/UL (ref 1.6–8.3)
NEUTROPHILS NFR BLD AUTO: 89 %
NRBC # BLD AUTO: 0 10E3/UL
NRBC BLD AUTO-RTO: 0 /100
P AXIS - MUSE: 75 DEGREES
PLATELET # BLD AUTO: 178 10E3/UL (ref 150–450)
POTASSIUM SERPL-SCNC: 3.4 MMOL/L (ref 3.4–5.3)
PR INTERVAL - MUSE: 164 MS
PROT SERPL-MCNC: 8.2 G/DL (ref 6.4–8.3)
QRS DURATION - MUSE: 88 MS
QT - MUSE: 390 MS
QTC - MUSE: 444 MS
R AXIS - MUSE: 92 DEGREES
RBC # BLD AUTO: 5.04 10E6/UL (ref 3.8–5.2)
SODIUM SERPL-SCNC: 142 MMOL/L (ref 136–145)
SYSTOLIC BLOOD PRESSURE - MUSE: NORMAL MMHG
T AXIS - MUSE: 67 DEGREES
VENTRICULAR RATE- MUSE: 78 BPM
WBC # BLD AUTO: 12 10E3/UL (ref 4–11)

## 2023-03-31 PROCEDURE — 85004 AUTOMATED DIFF WBC COUNT: CPT | Performed by: EMERGENCY MEDICINE

## 2023-03-31 PROCEDURE — 96374 THER/PROPH/DIAG INJ IV PUSH: CPT | Performed by: EMERGENCY MEDICINE

## 2023-03-31 PROCEDURE — 84703 CHORIONIC GONADOTROPIN ASSAY: CPT | Performed by: EMERGENCY MEDICINE

## 2023-03-31 PROCEDURE — 93005 ELECTROCARDIOGRAM TRACING: CPT | Performed by: EMERGENCY MEDICINE

## 2023-03-31 PROCEDURE — 80053 COMPREHEN METABOLIC PANEL: CPT | Performed by: EMERGENCY MEDICINE

## 2023-03-31 PROCEDURE — 250N000011 HC RX IP 250 OP 636: Performed by: EMERGENCY MEDICINE

## 2023-03-31 PROCEDURE — 36415 COLL VENOUS BLD VENIPUNCTURE: CPT | Performed by: EMERGENCY MEDICINE

## 2023-03-31 PROCEDURE — 99284 EMERGENCY DEPT VISIT MOD MDM: CPT | Mod: 25 | Performed by: EMERGENCY MEDICINE

## 2023-03-31 PROCEDURE — 258N000003 HC RX IP 258 OP 636: Performed by: EMERGENCY MEDICINE

## 2023-03-31 PROCEDURE — 93010 ELECTROCARDIOGRAM REPORT: CPT | Performed by: EMERGENCY MEDICINE

## 2023-03-31 PROCEDURE — 96361 HYDRATE IV INFUSION ADD-ON: CPT | Performed by: EMERGENCY MEDICINE

## 2023-03-31 PROCEDURE — 96376 TX/PRO/DX INJ SAME DRUG ADON: CPT | Performed by: EMERGENCY MEDICINE

## 2023-03-31 RX ORDER — ONDANSETRON 2 MG/ML
4 INJECTION INTRAMUSCULAR; INTRAVENOUS ONCE
Status: COMPLETED | OUTPATIENT
Start: 2023-03-31 | End: 2023-03-31

## 2023-03-31 RX ORDER — ONDANSETRON 4 MG/1
4 TABLET, ORALLY DISINTEGRATING ORAL EVERY 8 HOURS PRN
Qty: 10 TABLET | Refills: 0 | Status: SHIPPED | OUTPATIENT
Start: 2023-03-31 | End: 2023-04-03

## 2023-03-31 RX ORDER — SODIUM CHLORIDE 9 MG/ML
INJECTION, SOLUTION INTRAVENOUS CONTINUOUS
Status: DISCONTINUED | OUTPATIENT
Start: 2023-03-31 | End: 2023-03-31 | Stop reason: HOSPADM

## 2023-03-31 RX ADMIN — SODIUM CHLORIDE: 9 INJECTION, SOLUTION INTRAVENOUS at 05:04

## 2023-03-31 RX ADMIN — ONDANSETRON 4 MG: 2 INJECTION INTRAMUSCULAR; INTRAVENOUS at 03:39

## 2023-03-31 RX ADMIN — SODIUM CHLORIDE 1000 ML: 9 INJECTION, SOLUTION INTRAVENOUS at 03:40

## 2023-03-31 RX ADMIN — ONDANSETRON 4 MG: 2 INJECTION INTRAMUSCULAR; INTRAVENOUS at 06:03

## 2023-03-31 ASSESSMENT — ACTIVITIES OF DAILY LIVING (ADL): ADLS_ACUITY_SCORE: 35

## 2023-03-31 NOTE — DISCHARGE INSTRUCTIONS
Please make an appointment to follow up with Your Primary Care Provider to discuss possible cardiology referral.    Return to the emergency department if you develop recurrent episodes of fainting, worsening symptoms or if you have any further concerns

## 2023-03-31 NOTE — ED TRIAGE NOTES
Addended by: KIRA BARNES on: 3/6/2020 11:27 AM     Modules accepted: Orders     Pt states she went to bed feeling nauseated. States she woke up at about 0145 and partner stated she had a syncopal episode. States she was carried to the bathroom and had an incontinent episode of diarrhea at that time. Pt states she was coming in and out of consciousness and hands were stiff. Pt denies any dx medical hx. States she has fainted twice before. Last time was in June and before then the one before then was 5 or 6 years ago. The first time it happened she was seen in ED and discharged with a diagnosis of anxiety. The last time it happened in June, she was at her PCP's office, but no dx was given to her at that time.      Triage Assessment     Row Name 03/31/23 0251       Triage Assessment (Adult)    Airway WDL WDL       Respiratory WDL    Respiratory WDL WDL       Skin Circulation/Temperature WDL    Skin Circulation/Temperature WDL WDL       Cardiac WDL    Cardiac WDL WDL       Peripheral/Neurovascular WDL    Peripheral Neurovascular WDL WDL       Cognitive/Neuro/Behavioral WDL    Cognitive/Neuro/Behavioral WDL WDL

## 2023-03-31 NOTE — ED PROVIDER NOTES
"ED Provider Note  [facility]      History     Chief Complaint   Patient presents with     Nausea     Syncope     Diarrhea     HPI  Victoria Solares is a 22 year old female who is presenting with nausea, diarrhea and syncopal episode. She states she had watery diarrhea starting overnight today without black/blood. Also pronounced nausea without vomiting. No abd pain, dysuria or hematuria. Her sibling has had similar symptoms recently. No chest pain, SOB, palpitations before or after event. She passed out for a few seconds. This was on the toilet. No fall/head injury. Patient had no exertion prior to event. No postictal period, tongue biting, urinary incontinence. No leg pain/swelling or hx DVT. No recent drug/etoh abuse.    Past Medical History  Past Medical History:   Diagnosis Date     Anxiety      Panic attacks      Past Surgical History:   Procedure Laterality Date     TONSILLECTOMY       etonogestrel-ethinyl estradiol (NUVARING) 0.12-0.015 MG/24HR vaginal ring  FLUOXETINE HCL PO  HYDROXYZINE HCL PO  levonorgestrel (MIRENA, 52 MG,) 20 MCG/24HR IUD      Allergies   Allergen Reactions     Azithromycin Rash     Family History  No family history on file.  Social History   Social History     Tobacco Use     Smoking status: Never     Smokeless tobacco: Never   Substance Use Topics     Alcohol use: No     Drug use: No         A medically appropriate review of systems was performed with pertinent positives and negatives noted in the HPI, and all other systems negative.    Physical Exam   BP: 123/83  Pulse: 99  Resp: 15  Height: 160 cm (5' 3\")  Weight: 53.1 kg (117 lb)  SpO2: 100 %  Physical Exam  Physical Exam   Constitutional: oriented to person, place, and time. appears well-developed and well-nourished.   HENT:   Head: Normocephalic and atraumatic.   Neck: Normal range of motion.   Pulmonary/Chest: Effort normal. No respiratory distress. Clear lungs bilaterally.  Cardiac: No murmurs, rubs, " gallops. RRR.  Abdominal: Abdomen soft, nontender, nondistended. No rebound tenderness.  MSK: Long bones without deformity or evidence of trauma.  No lower extremity edema.  Neurological: alert and oriented to person, place, and time. Stable gait.  Sensation intact in all extremities.  Cranial nerves II through XII normal.   strength, bicep and tricep strength 5 out of 5 and symmetric.  Coordination normal.  Skin: Skin is warm and dry.   Psychiatric:  normal mood and affect.  behavior is normal. Thought content normal.       ED Course, Procedures, & Data      Procedures       ED Course Selections:        EKG Interpretation:      Interpreted by Maximus Hansen MD  Time reviewed: 0350  Symptoms at time of EKG: syncope   Rhythm: normal sinus   Rate: normal  Axis: RAD  Ectopy: none  Conduction: normal  ST Segments/ T Waves: No ST-T wave changes  Q Waves: none  Comparison to prior: Unchanged    Clinical Impression: no acute changes from prior. No evidence of WPW, brugada, prolonged qtc, ARVD, block, ischemia                     No results found for any visits on 03/31/23.  Medications   0.9% sodium chloride BOLUS (has no administration in time range)     Followed by   sodium chloride 0.9% infusion (has no administration in time range)   ondansetron (ZOFRAN) injection 4 mg (has no administration in time range)     Labs Ordered and Resulted from Time of ED Arrival to Time of ED Departure - No data to display  No orders to display          Critical care was not performed.     Medical Decision Making  The patient's presentation was of moderate complexity (an undiagnosed new problem with uncertain diagnosis).    The patient's evaluation involved:  review of external note(s) from 1 sources (prior ED visit)  ordering and/or review of 3+ test(s) in this encounter (see separate area of note for details)  strong consideration of a test (CT abd) that was ultimately deferred    The patient's management necessitated moderate  risk (prescription drug management including medications given in the ED).      Assessment & Plan    MDM  Patient presenting with nausea, diarrhea and an episode of syncope.  EKG does not show signs of Brugada, WPW, block, ischemia or other concern for precipitating event.  She does seem to faint fairly often, this is likely due to a bowel movement and episode of diarrhea.  She she has a mild anion gap and decreased bicarbonate likely due to diarrhea and dehydration.  She is given fluids.  She had no symptoms here in emergency department, feels quite well and is nontoxic.  No symptoms or signs of ACS or pulmonary embolism.  No symptoms or signs of sepsis.  She has a benign abdomen, unlikely obstruction, appendicitis, diverticulitis or other intra-abdominal catastrophe.  She is given Zofran for discharge and will follow up with primary care provider.    I have reviewed the nursing notes. I have reviewed the findings, diagnosis, plan and need for follow up with the patient.    New Prescriptions    ONDANSETRON (ZOFRAN ODT) 4 MG ODT TAB    Take 1 tablet (4 mg) by mouth every 8 hours as needed for nausea       Final diagnoses:   Syncope, unspecified syncope type   Diarrhea, unspecified type       Maximus Hansen  [facility] EMERGENCY DEPARTMENT  3/31/2023     Maximus Hansen MD  03/31/23 0577

## 2023-04-01 ENCOUNTER — NURSE TRIAGE (OUTPATIENT)
Dept: NURSING | Facility: CLINIC | Age: 23
End: 2023-04-01
Payer: COMMERCIAL

## 2023-04-01 ENCOUNTER — HOSPITAL ENCOUNTER (EMERGENCY)
Facility: CLINIC | Age: 23
Discharge: HOME | End: 2023-04-01
Attending: EMERGENCY MEDICINE | Admitting: EMERGENCY MEDICINE
Payer: COMMERCIAL

## 2023-04-01 VITALS
DIASTOLIC BLOOD PRESSURE: 80 MMHG | RESPIRATION RATE: 16 BRPM | TEMPERATURE: 97.6 F | HEART RATE: 75 BPM | OXYGEN SATURATION: 100 % | SYSTOLIC BLOOD PRESSURE: 125 MMHG

## 2023-04-01 DIAGNOSIS — R19.7 DIARRHEA, UNSPECIFIED TYPE: ICD-10-CM

## 2023-04-01 LAB
ANION GAP SERPL CALCULATED.3IONS-SCNC: 11 MMOL/L (ref 7–15)
BASOPHILS # BLD AUTO: 0 10E3/UL (ref 0–0.2)
BASOPHILS NFR BLD AUTO: 1 %
BUN SERPL-MCNC: 9.4 MG/DL (ref 6–20)
CALCIUM SERPL-MCNC: 9.2 MG/DL (ref 8.6–10)
CHLORIDE SERPL-SCNC: 103 MMOL/L (ref 98–107)
CREAT SERPL-MCNC: 0.65 MG/DL (ref 0.51–0.95)
DEPRECATED HCO3 PLAS-SCNC: 25 MMOL/L (ref 22–29)
EOSINOPHIL # BLD AUTO: 0.2 10E3/UL (ref 0–0.7)
EOSINOPHIL NFR BLD AUTO: 4 %
ERYTHROCYTE [DISTWIDTH] IN BLOOD BY AUTOMATED COUNT: 12.2 % (ref 10–15)
GFR SERPL CREATININE-BSD FRML MDRD: >90 ML/MIN/1.73M2
GLUCOSE BLDC GLUCOMTR-MCNC: 105 MG/DL (ref 70–99)
GLUCOSE SERPL-MCNC: 124 MG/DL (ref 70–99)
HCT VFR BLD AUTO: 39.3 % (ref 35–47)
HGB BLD-MCNC: 13.5 G/DL (ref 11.7–15.7)
HOLD SPECIMEN: NORMAL
IMM GRANULOCYTES # BLD: 0 10E3/UL
IMM GRANULOCYTES NFR BLD: 0 %
LYMPHOCYTES # BLD AUTO: 1.2 10E3/UL (ref 0.8–5.3)
LYMPHOCYTES NFR BLD AUTO: 26 %
MCH RBC QN AUTO: 31.6 PG (ref 26.5–33)
MCHC RBC AUTO-ENTMCNC: 34.4 G/DL (ref 31.5–36.5)
MCV RBC AUTO: 92 FL (ref 78–100)
MONOCYTES # BLD AUTO: 0.4 10E3/UL (ref 0–1.3)
MONOCYTES NFR BLD AUTO: 8 %
NEUTROPHILS # BLD AUTO: 2.9 10E3/UL (ref 1.6–8.3)
NEUTROPHILS NFR BLD AUTO: 61 %
NRBC # BLD AUTO: 0 10E3/UL
NRBC BLD AUTO-RTO: 0 /100
PLATELET # BLD AUTO: 147 10E3/UL (ref 150–450)
POTASSIUM SERPL-SCNC: 3.3 MMOL/L (ref 3.4–5.3)
RBC # BLD AUTO: 4.27 10E6/UL (ref 3.8–5.2)
SODIUM SERPL-SCNC: 139 MMOL/L (ref 136–145)
WBC # BLD AUTO: 4.7 10E3/UL (ref 4–11)

## 2023-04-01 PROCEDURE — 99283 EMERGENCY DEPT VISIT LOW MDM: CPT | Mod: 25 | Performed by: EMERGENCY MEDICINE

## 2023-04-01 PROCEDURE — 82310 ASSAY OF CALCIUM: CPT | Performed by: EMERGENCY MEDICINE

## 2023-04-01 PROCEDURE — 82962 GLUCOSE BLOOD TEST: CPT

## 2023-04-01 PROCEDURE — 36415 COLL VENOUS BLD VENIPUNCTURE: CPT | Performed by: EMERGENCY MEDICINE

## 2023-04-01 PROCEDURE — 85004 AUTOMATED DIFF WBC COUNT: CPT | Performed by: EMERGENCY MEDICINE

## 2023-04-01 PROCEDURE — 99283 EMERGENCY DEPT VISIT LOW MDM: CPT | Performed by: EMERGENCY MEDICINE

## 2023-04-01 PROCEDURE — 258N000003 HC RX IP 258 OP 636: Performed by: EMERGENCY MEDICINE

## 2023-04-01 PROCEDURE — 96360 HYDRATION IV INFUSION INIT: CPT | Performed by: EMERGENCY MEDICINE

## 2023-04-01 PROCEDURE — 96361 HYDRATE IV INFUSION ADD-ON: CPT | Performed by: EMERGENCY MEDICINE

## 2023-04-01 RX ADMIN — SODIUM CHLORIDE 1000 ML: 9 INJECTION, SOLUTION INTRAVENOUS at 17:34

## 2023-04-01 ASSESSMENT — ACTIVITIES OF DAILY LIVING (ADL)
ADLS_ACUITY_SCORE: 33
ADLS_ACUITY_SCORE: 35

## 2023-04-01 NOTE — ED TRIAGE NOTES
"Patient ambulatory to triage for \"feeling fainty.\" Patient states she feels very dehydrated and believes she just needs fluids. BG in triage is 105.       "

## 2023-04-01 NOTE — TELEPHONE ENCOUNTER
"The patient is complaining of feeling faint and shaking  Symptoms started a few days ago    The patient was seen in the ED on 04/01/23  No nausea today, did have a headache and now it is gone after taking ibuprofen    She thinks it may have been food poisoning    Triage guidelines recommend to go to ED now    Caller verbalized and understands directives'    Reason for Disposition    [1] Drinking very little AND [2] dehydration suspected (e.g., no urine > 12 hours, very dry mouth, very lightheaded)    Additional Information    Negative: Still unconscious    Negative: Difficult to awaken or acting confused (e.g., disoriented, slurred speech)    Negative: Shock suspected (e.g., cold/pale/clammy skin, too weak to stand, low BP, rapid pulse)    Negative: Difficulty breathing    Negative: Bluish (or gray) lips or face now    Negative: Chest pain    Negative: Extra heart beats or heart is beating fast (i.e.,\"palpitations\")    Negative: Bleeding (e.g., vomiting blood, rectal bleeding or tarry stools, severe vaginal bleeding)(Exception: fainted from sight of small amount of blood; small cut or abrasion)    Negative: Fainted suddenly after medicine, allergic food or bee sting    Negative: Age > 50 years (Exception: occurred > 1 hour ago AND now feels completely fine)    Negative: History of heart problems (e.g., congestive heart failure, heart attack)    Negative: [1] Fainted > 15 minutes ago AND [2] still feels too weak or dizzy to stand    Negative: Sounds like a life-threatening emergency to the triager    Negative: [1] Diabetes mellitus AND [2] fainting from low blood sugar (i.e., < 70 mg/dl or 3.9 mmol/l)    Negative: Seizure suspected (e.g., muscle jerking or shaking followed by confusion)    Negative: Heat exhaustion suspected (i.e., dehydration from heat exposure)    Negative: [1] Fainted > 15 minutes ago AND [2] still looks pale (pale skin, pallor)    Negative: [1] Fainted > 15 minutes ago AND [2] still feels weak or " dizzy    Negative: Occurred during exercise    Negative: Any head or face injury    Negative: Pregnant or possibly pregnant    Negative: Fainted 2 times in one day    Protocols used: [id number]

## 2023-04-01 NOTE — ED PROVIDER NOTES
[place] EMERGENCY DEPARTMENT ([facility])  April 1, 2023      History     Chief Complaint   Patient presents with     Dehydration     HPI  Victoria Solares is a 22 year old female who was seen in the ER yesterday, and was treated for a diarrheal illness with IV fluids and subsequently sent home.  Patient believes she had food poisoning, as she had multiple episodes of diarrhea over a 24-hour period. Patient's diarrhea was associated with an episode of syncope as well, and an EKG done as part of her work-up was unremarkable.  Patient states that since being discharged yesterday morning she has had approximately 5 more episodes of diarrhea with little p.o. intake and feels dehydrated.  Patient reports some fatigue, some generalized malaise, and reports back to the ER for evaluation. Labs done yesterday revealed a negative pregnancy test with a white count of 12 and a hemoglobin of 15.7 along with a comprehensive panel revealing a sodium of 142, potassium 3.4, carbon dioxide of 19, and anion gap of 16 with a glucose of 124 but normal liver enzymes.      Of note is that review of her medical records reveals that in mid February she had multiple stool studies done for a diarrheal illness including O&P, Cryptosporidium etc. all of which were negative      This part of the medical record was transcribed by Calixto Conway, Medical Scribe, from a dictation done by Franck Wright Md, MD.       Past Medical History  Past Medical History:   Diagnosis Date     Anxiety      Panic attacks      Past Surgical History:   Procedure Laterality Date     TONSILLECTOMY       etonogestrel-ethinyl estradiol (NUVARING) 0.12-0.015 MG/24HR vaginal ring  FLUOXETINE HCL PO  HYDROXYZINE HCL PO  levonorgestrel (MIRENA, 52 MG,) 20 MCG/24HR IUD  ondansetron (ZOFRAN ODT) 4 MG ODT tab      Allergies   Allergen Reactions     Azithromycin Rash     Family History  No family history on file.     Social History   Social History      Tobacco Use     Smoking status: Never     Smokeless tobacco: Never   Substance Use Topics     Alcohol use: No     Drug use: No      Past medical history, past surgical history, medications, allergies, family history, and social history were reviewed with the patient. No additional pertinent items.      A medically appropriate review of systems was performed with pertinent positives and negatives noted in the HPI, and all other systems negative.    Physical Exam   BP: 134/87  Pulse: 78  Temp: 97.6  F (36.4  C)  Resp: 16  SpO2: 100 %  Physical Exam  Vitals and nursing note reviewed.   Constitutional:       General: She is not in acute distress.     Comments: Conversant pleasant but appears slightly fatigued   Eyes:      Extraocular Movements: Extraocular movements intact.      Pupils: Pupils are equal, round, and reactive to light.   Cardiovascular:      Rate and Rhythm: Regular rhythm.   Pulmonary:      Breath sounds: Normal breath sounds.   Abdominal:      Palpations: Abdomen is soft.      Tenderness: There is no abdominal tenderness.   Musculoskeletal:         General: No deformity.      Cervical back: Neck supple.   Neurological:      General: No focal deficit present.      Mental Status: She is alert and oriented to person, place, and time.   Psychiatric:         Mood and Affect: Mood normal.           ED Course, Procedures, & Data      Procedures        Results for orders placed or performed during the hospital encounter of 04/01/23   Glucose by meter     Status: Abnormal   Result Value Ref Range    GLUCOSE BY METER POCT 105 (H) 70 - 99 mg/dL   Basic metabolic panel     Status: Abnormal   Result Value Ref Range    Sodium 139 136 - 145 mmol/L    Potassium 3.3 (L) 3.4 - 5.3 mmol/L    Chloride 103 98 - 107 mmol/L    Carbon Dioxide (CO2) 25 22 - 29 mmol/L    Anion Gap 11 7 - 15 mmol/L    Urea Nitrogen 9.4 6.0 - 20.0 mg/dL    Creatinine 0.65 0.51 - 0.95 mg/dL    Calcium 9.2 8.6 - 10.0 mg/dL    Glucose 124 (H) 70  - 99 mg/dL    GFR Estimate >90 >60 mL/min/1.73m2   CBC with platelets and differential     Status: Abnormal   Result Value Ref Range    WBC Count 4.7 4.0 - 11.0 10e3/uL    RBC Count 4.27 3.80 - 5.20 10e6/uL    Hemoglobin 13.5 11.7 - 15.7 g/dL    Hematocrit 39.3 35.0 - 47.0 %    MCV 92 78 - 100 fL    MCH 31.6 26.5 - 33.0 pg    MCHC 34.4 31.5 - 36.5 g/dL    RDW 12.2 10.0 - 15.0 %    Platelet Count 147 (L) 150 - 450 10e3/uL    % Neutrophils 61 %    % Lymphocytes 26 %    % Monocytes 8 %    % Eosinophils 4 %    % Basophils 1 %    % Immature Granulocytes 0 %    NRBCs per 100 WBC 0 <1 /100    Absolute Neutrophils 2.9 1.6 - 8.3 10e3/uL    Absolute Lymphocytes 1.2 0.8 - 5.3 10e3/uL    Absolute Monocytes 0.4 0.0 - 1.3 10e3/uL    Absolute Eosinophils 0.2 0.0 - 0.7 10e3/uL    Absolute Basophils 0.0 0.0 - 0.2 10e3/uL    Absolute Immature Granulocytes 0.0 <=0.4 10e3/uL    Absolute NRBCs 0.0 10e3/uL   [place] Draw     Status: None    Narrative    The following orders were created for panel order [place] Draw.  Procedure                               Abnormality         Status                     ---------                               -----------         ------                     Extra Blue Top Tube[[id number]]                              Final result               Extra Red Top Tube[[id number]]                               Final result               Extra Urine Collection[[id number]]                           Final result                 Please view results for these tests on the individual orders.   Extra Blue Top Tube     Status: None   Result Value Ref Range    Hold Specimen JIC    Extra Red Top Tube     Status: None   Result Value Ref Range    Hold Specimen JIC    Extra Urine Collection     Status: None   Result Value Ref Range    Hold Specimen JIC    CBC with platelets differential     Status: Abnormal    Narrative    The following orders were created for panel order CBC with platelets differential.  Procedure                                Abnormality         Status                     ---------                               -----------         ------                     CBC with platelets and d...[[id number]]  Abnormal            Final result                 Please view results for these tests on the individual orders.     Medications   sodium chloride (PF) 0.9% PF flush 3 mL (3 mLs Intracatheter Not Given 4/1/23 1739)   sodium chloride (PF) 0.9% PF flush 3 mL (has no administration in time range)   0.9% sodium chloride BOLUS (0 mLs Intravenous Stopped 4/1/23 1930)   Was given 2 L normal saline IV        Critical care was not performed.     Medical Decision Making  The patient's presentation was of low complexity (an acute and uncomplicated illness or injury).    The patient's evaluation involved:  review of external note(s) from 1 sources (See epic)  ordering and/or review of 3+ test(s) in this encounter (See above)  review of 3+ test result(s) ordered prior to this encounter (See epic)    The patient's management necessitated only low risk treatment.      Assessment & Plan      I have reviewed the nursing notes.    Patient received 2 L of normal saline IV here in the ER and was feeling better after this.  Patient will be sent home to start advancing her diet normally.    I have reviewed the findings, diagnosis, plan and need for follow up with the patient.    New Prescriptions    No medications on file       Final diagnoses:   Diarrhea, unspecified type     Home tonight with mother    Advance diet as tolerated    Please make an appointment to follow up with Your Primary Care Provider in 3-4 days if not improving.    Routine discharge instructions were given for this diagnosis    ICalixto, am serving as a trained medical scribe to document services personally performed by Franck Wright MD, based on the provider's statements to me.     IFranck MD, was physically present and have reviewed and  verified the accuracy of this note documented by Calixto Conway.      Franck Wright Md  [facility] EMERGENCY DEPARTMENT  4/1/2023     Franck Wright MD  04/01/23 1935

## 2023-04-02 NOTE — DISCHARGE INSTRUCTIONS
Home tonight with mother    Advance diet as tolerated    Please make an appointment to follow up with Your Primary Care Provider in 3-4 days if not improving.

## 2023-06-04 ENCOUNTER — HEALTH MAINTENANCE LETTER (OUTPATIENT)
Age: 23
End: 2023-06-04

## 2023-06-05 ENCOUNTER — HOSPITAL ENCOUNTER (EMERGENCY)
Facility: CLINIC | Age: 23
Discharge: HOME | End: 2023-06-05
Attending: EMERGENCY MEDICINE | Admitting: EMERGENCY MEDICINE
Payer: COMMERCIAL

## 2023-06-05 VITALS
DIASTOLIC BLOOD PRESSURE: 94 MMHG | OXYGEN SATURATION: 100 % | HEART RATE: 84 BPM | SYSTOLIC BLOOD PRESSURE: 133 MMHG | TEMPERATURE: 99.1 F | RESPIRATION RATE: 15 BRPM

## 2023-06-05 DIAGNOSIS — R40.4 TRANSIENT ALTERATION OF AWARENESS: ICD-10-CM

## 2023-06-05 DIAGNOSIS — R53.83 FATIGUE, UNSPECIFIED TYPE: ICD-10-CM

## 2023-06-05 LAB
ALBUMIN SERPL BCG-MCNC: 4.3 G/DL (ref 3.5–5.2)
ALP SERPL-CCNC: 47 U/L (ref 35–104)
ALT SERPL W P-5'-P-CCNC: 18 U/L (ref 10–35)
ANION GAP SERPL CALCULATED.3IONS-SCNC: 11 MMOL/L (ref 7–15)
AST SERPL W P-5'-P-CCNC: 36 U/L (ref 10–35)
BASOPHILS # BLD AUTO: 0.1 10E3/UL (ref 0–0.2)
BASOPHILS NFR BLD AUTO: 1 %
BILIRUB SERPL-MCNC: 0.4 MG/DL
BUN SERPL-MCNC: 13.8 MG/DL (ref 6–20)
CALCIUM SERPL-MCNC: 9.1 MG/DL (ref 8.6–10)
CHLORIDE SERPL-SCNC: 105 MMOL/L (ref 98–107)
CREAT SERPL-MCNC: 0.88 MG/DL (ref 0.51–0.95)
DEPRECATED HCO3 PLAS-SCNC: 23 MMOL/L (ref 22–29)
EOSINOPHIL # BLD AUTO: 0.2 10E3/UL (ref 0–0.7)
EOSINOPHIL NFR BLD AUTO: 4 %
ERYTHROCYTE [DISTWIDTH] IN BLOOD BY AUTOMATED COUNT: 12.7 % (ref 10–15)
GFR SERPL CREATININE-BSD FRML MDRD: >90 ML/MIN/1.73M2
GLUCOSE BLDC GLUCOMTR-MCNC: 106 MG/DL (ref 70–99)
GLUCOSE SERPL-MCNC: 109 MG/DL (ref 70–99)
HCT VFR BLD AUTO: 39.7 % (ref 35–47)
HGB BLD-MCNC: 13.4 G/DL (ref 11.7–15.7)
HOLD SPECIMEN: NORMAL
IMM GRANULOCYTES # BLD: 0 10E3/UL
IMM GRANULOCYTES NFR BLD: 0 %
LACTATE SERPL-SCNC: 0.8 MMOL/L (ref 0.7–2)
LYMPHOCYTES # BLD AUTO: 2 10E3/UL (ref 0.8–5.3)
LYMPHOCYTES NFR BLD AUTO: 33 %
MCH RBC QN AUTO: 31.2 PG (ref 26.5–33)
MCHC RBC AUTO-ENTMCNC: 33.8 G/DL (ref 31.5–36.5)
MCV RBC AUTO: 93 FL (ref 78–100)
MONOCYTES # BLD AUTO: 0.4 10E3/UL (ref 0–1.3)
MONOCYTES NFR BLD AUTO: 7 %
NEUTROPHILS # BLD AUTO: 3.4 10E3/UL (ref 1.6–8.3)
NEUTROPHILS NFR BLD AUTO: 55 %
NRBC # BLD AUTO: 0 10E3/UL
NRBC BLD AUTO-RTO: 0 /100
PLATELET # BLD AUTO: 169 10E3/UL (ref 150–450)
POTASSIUM SERPL-SCNC: 3.4 MMOL/L (ref 3.4–5.3)
PROT SERPL-MCNC: 7.2 G/DL (ref 6.4–8.3)
RBC # BLD AUTO: 4.29 10E6/UL (ref 3.8–5.2)
SODIUM SERPL-SCNC: 139 MMOL/L (ref 136–145)
WBC # BLD AUTO: 6.2 10E3/UL (ref 4–11)

## 2023-06-05 PROCEDURE — 99284 EMERGENCY DEPT VISIT MOD MDM: CPT | Performed by: EMERGENCY MEDICINE

## 2023-06-05 PROCEDURE — 80053 COMPREHEN METABOLIC PANEL: CPT | Performed by: EMERGENCY MEDICINE

## 2023-06-05 PROCEDURE — 85025 COMPLETE CBC W/AUTO DIFF WBC: CPT | Performed by: EMERGENCY MEDICINE

## 2023-06-05 PROCEDURE — 99283 EMERGENCY DEPT VISIT LOW MDM: CPT

## 2023-06-05 PROCEDURE — 82962 GLUCOSE BLOOD TEST: CPT

## 2023-06-05 PROCEDURE — 83605 ASSAY OF LACTIC ACID: CPT | Performed by: EMERGENCY MEDICINE

## 2023-06-05 PROCEDURE — 36415 COLL VENOUS BLD VENIPUNCTURE: CPT | Performed by: EMERGENCY MEDICINE

## 2023-06-05 ASSESSMENT — ACTIVITIES OF DAILY LIVING (ADL)
ADLS_ACUITY_SCORE: 35
ADLS_ACUITY_SCORE: 35

## 2023-06-05 NOTE — DISCHARGE INSTRUCTIONS
Please follow-up with your primary care provider and neurology.  A referral has been placed so neurology should call you to schedule an appointment.  Please continue your own medications.  Return to the emergency department if any worsening symptoms.

## 2023-06-05 NOTE — ED TRIAGE NOTES
"Pt ambulatory to triage with c/o fatigue. HX anxiety. Pt states that for the past few night she has abruptly woken from sleep and began shaking. Pt's partner states that during these episodes pt is very lethargic & altered, and not \"her normal self\". Pt states she is now back to her baseline, but is worried she may have had a seizure. Pt A&Ox4, VSS on RA, denies pain.      Triage Assessment     Row Name 06/05/23 0159       Triage Assessment (Adult)    Airway WDL WDL       Respiratory WDL    Respiratory WDL WDL       Skin Circulation/Temperature WDL    Skin Circulation/Temperature WDL WDL       Cardiac WDL    Cardiac WDL WDL       Peripheral/Neurovascular WDL    Peripheral Neurovascular WDL WDL       Cognitive/Neuro/Behavioral WDL    Cognitive/Neuro/Behavioral WDL WDL              "

## 2023-06-05 NOTE — ED PROVIDER NOTES
ED Provider Note  [facility]      History     Chief Complaint   Patient presents with     Fatigue     HPI  Victoria Solares is a 22 year old female who has a past medical history of anxiety who presents the emergency department for evaluation of fatigue.  Patient reports that last night she went to bed however woke up around 12:30 AM this morning in a disorientated state.  Patient states that she woke up feeling as if she was almost in a dream.  Patient states that after that she had a short episode where she was disoriented and her whole body was shaking.  Patient's partner witnessed the event and stated that she was very lethargic, altered, not her normal self.  Patient reports that she was awake during the episode but disoriented.  Patient states it is difficult to describe.  Patient also reports feeling some nausea, cold and tingling sensations.  Patient states currently is at her baseline.  Patient reports approximately 2-3 similar episodes however those other episodes were also associated with syncope.  Patient has been following up with her primary care provider, and has been in the emergency department as well.  Patient is scheduled for an MRI this afternoon however is also wondering if she can get a EEG.  Patient denies any fever, chest pain, shortening, cough or cold-like symptoms.  Denies any abdominal pain, vomiting, dysuria, incontinence.  No other complaints.  Patient reports social alcohol use, denies any substance use.  No other new medications.  No other symptoms.    Past Medical History  Past Medical History:   Diagnosis Date     Anxiety      Panic attacks      Past Surgical History:   Procedure Laterality Date     TONSILLECTOMY       etonogestrel-ethinyl estradiol (NUVARING) 0.12-0.015 MG/24HR vaginal ring  FLUOXETINE HCL PO  HYDROXYZINE HCL PO  levonorgestrel (MIRENA, 52 MG,) 20 MCG/24HR IUD      Allergies   Allergen Reactions     Azithromycin Rash     Family History  No  family history on file.  Social History   Social History     Tobacco Use     Smoking status: Never     Smokeless tobacco: Never   Substance Use Topics     Alcohol use: No     Drug use: No      Past medical history, past surgical history, medications, allergies, family history, and social history were reviewed with the patient. No additional pertinent items.      A medically appropriate review of systems was performed with pertinent positives and negatives noted in the HPI, and all other systems negative.    Physical Exam   BP: (!) 149/91  Pulse: 82  Temp: 99.1  F (37.3  C)  Resp: 15  SpO2: 100 %  Physical Exam  General: Afebrile, no acute distress   HEENT: Normocephalic, atraumatic, conjunctivae normal. MMM  Neck: non-tender, supple  Cardio: regular rate. regular rhythm   Resp: Normal work of breathing, no respiratory distress, lungs clear bilaterally, no wheezing, rhonchi, rales  Chest/Back: no visual signs of trauma, no CVA tenderness   Abdomen: soft, non distension, no tenderness, no peritoneal signs   Neuro: alert and fully oriented. CN II-XII grossly intact. Grossly normal strength and sensation in all extremities.   MSK: no deformities. Normal range of motion  Integumentary/Skin: no rash visualized, normal color  Psych: normal affect, normal behavior      ED Course, Procedures, & Data      Procedures       Results for orders placed or performed during the hospital encounter of 06/05/23   [place] Draw     Status: None    Narrative    The following orders were created for panel order [place] Draw.  Procedure                               Abnormality         Status                     ---------                               -----------         ------                     Extra Blue Top Tube[[id number]]                              Final result               Extra Red Top Tube[[id number]]                               Final result               Extra Green Top (Lithium...[[id number]]                      Final result                Extra Purple Top Tube[[id number]]                            Final result               Extra Green Top (Lithium...[[id number]]                      Final result                 Please view results for these tests on the individual orders.   Extra Blue Top Tube     Status: None   Result Value Ref Range    Hold Specimen JIC    Extra Red Top Tube     Status: None   Result Value Ref Range    Hold Specimen JIC    Extra Green Top (Lithium Heparin) Tube     Status: None   Result Value Ref Range    Hold Specimen JIC    Extra Purple Top Tube     Status: None   Result Value Ref Range    Hold Specimen JIC    Extra Green Top (Lithium Heparin) ON ICE     Status: None   Result Value Ref Range    Hold Specimen JIC    Glucose by meter     Status: Abnormal   Result Value Ref Range    GLUCOSE BY METER POCT 106 (H) 70 - 99 mg/dL   Comprehensive metabolic panel     Status: None (Preliminary result)   Result Value Ref Range    Sodium 139 136 - 145 mmol/L    Potassium 3.4 3.4 - 5.3 mmol/L    Chloride 105 98 - 107 mmol/L    Carbon Dioxide (CO2)      Anion Gap      Urea Nitrogen      Creatinine      Calcium      Glucose      Alkaline Phosphatase      AST      ALT      Protein Total      Albumin      Bilirubin Total      GFR Estimate     Lactic acid whole blood     Status: Normal   Result Value Ref Range    Lactic Acid 0.8 0.7 - 2.0 mmol/L   CBC with platelets and differential     Status: None   Result Value Ref Range    WBC Count 6.2 4.0 - 11.0 10e3/uL    RBC Count 4.29 3.80 - 5.20 10e6/uL    Hemoglobin 13.4 11.7 - 15.7 g/dL    Hematocrit 39.7 35.0 - 47.0 %    MCV 93 78 - 100 fL    MCH 31.2 26.5 - 33.0 pg    MCHC 33.8 31.5 - 36.5 g/dL    RDW 12.7 10.0 - 15.0 %    Platelet Count 169 150 - 450 10e3/uL    % Neutrophils 55 %    % Lymphocytes 33 %    % Monocytes 7 %    % Eosinophils 4 %    % Basophils 1 %    % Immature Granulocytes 0 %    NRBCs per 100 WBC 0 <1 /100    Absolute Neutrophils 3.4 1.6 - 8.3 10e3/uL    Absolute  Lymphocytes 2.0 0.8 - 5.3 10e3/uL    Absolute Monocytes 0.4 0.0 - 1.3 10e3/uL    Absolute Eosinophils 0.2 0.0 - 0.7 10e3/uL    Absolute Basophils 0.1 0.0 - 0.2 10e3/uL    Absolute Immature Granulocytes 0.0 <=0.4 10e3/uL    Absolute NRBCs 0.0 10e3/uL   CBC with platelets differential     Status: None    Narrative    The following orders were created for panel order CBC with platelets differential.  Procedure                               Abnormality         Status                     ---------                               -----------         ------                     CBC with platelets and d...[[id number]]                      Final result                 Please view results for these tests on the individual orders.     Medications - No data to display  Labs Ordered and Resulted from Time of ED Arrival to Time of ED Departure   GLUCOSE BY METER - Abnormal       Result Value    GLUCOSE BY METER POCT 106 (*)    LACTIC ACID WHOLE BLOOD - Normal    Lactic Acid 0.8     COMPREHENSIVE METABOLIC PANEL    Sodium 139      Potassium 3.4      Chloride 105      Carbon Dioxide (CO2)        Anion Gap        Urea Nitrogen        Creatinine        Calcium        Glucose        Alkaline Phosphatase        AST        ALT        Protein Total        Albumin        Bilirubin Total        GFR Estimate       CBC WITH PLATELETS AND DIFFERENTIAL    WBC Count 6.2      RBC Count 4.29      Hemoglobin 13.4      Hematocrit 39.7      MCV 93      MCH 31.2      MCHC 33.8      RDW 12.7      Platelet Count 169      % Neutrophils 55      % Lymphocytes 33      % Monocytes 7      % Eosinophils 4      % Basophils 1      % Immature Granulocytes 0      NRBCs per 100 WBC 0      Absolute Neutrophils 3.4      Absolute Lymphocytes 2.0      Absolute Monocytes 0.4      Absolute Eosinophils 0.2      Absolute Basophils 0.1      Absolute Immature Granulocytes 0.0      Absolute NRBCs 0.0       No orders to display          Critical care was not performed.      Medical Decision Making  The patient's presentation was of moderate complexity (an acute illness with systemic symptoms).    The patient's evaluation involved:  review of external note(s) from 3+ sources (ED visit 3/31/2023, 4/1/2023, office visit 4/5/2023, 5/9/2023)  ordering and/or review of 3+ test(s) in this encounter (see separate area of note for details)  review of 3+ test result(s) ordered prior to this encounter (prior ED visit labs, ekg, imaging )  strong consideration of a test (CT head - deferred cause MRI today) that was ultimately deferred  discussion of management or test interpretation with another health professional (neurology )    The patient's management necessitated only low risk treatment.    Assessment & Plan    Victoria Solares is a 22 year old female who has a past medical history of anxiety who presents the emergency department for evaluation of fatigue.  Upon arrival patient is nontoxic-appearing, afebrile, no distress.  Patient hemodynamically stable vital signs within normal limits.  Otherwise unremarkable physical exam, cranial nerves II through XII intact with no focal motor, sensory, speech, gait deficit.  Differential diagnosis includes but is not limited to anxiety versus night terror versus infectious versus metabolic/electrolyte versus syncope versus seizure versus other abnormal movement among others.  Upon arrival an IV was established, comprehensive labs were performed.  I reviewed comprehensive labs which are remarkable with white blood cell count 6.2, hemoglobin 13.4, no acute electrolyte abnormality, lactic acid 0.8.  And on reevaluation patient continues to be resting comfortably, no distress.  Patient continues to be back at her baseline.    Per chart review patient was seen in the emergency department on 3/31/2023 for syncopal episode along with diarrhea, dehydration.  Patient also has been following up with her primary care provider and had outpatient Holter monitor  and echocardiogram.  Patient scheduled for MRI today.     I discussed results with patient, low concern for seizure given history, patient reports being awake but disoriented during the episode, normal lactate.  No evidence of acute infection, metabolic or electrolyte abnormality.  I briefly discussed with neurology on the phone and will have patient follow-up outpatient for further evaluation.  Patient would like to obtain an EEG.  A neurology referral has been placed.  Encourage patient to go to her MRI today continue close follow-up with her primary care provider and strict return precautions discussed.  Patient understands and agrees with the plan.    I have reviewed the nursing notes. I have reviewed the findings, diagnosis, plan and need for follow up with the patient.    Discharge Medication List as of 6/5/2023  5:32 AM          Final diagnoses:   Fatigue, unspecified type   Transient alteration of awareness       Minoo Wright MD  [facility] EMERGENCY DEPARTMENT  6/5/2023     Minoo Wright MD  06/05/23 0609

## 2023-12-14 ENCOUNTER — HOSPITAL ENCOUNTER (EMERGENCY)
Facility: CLINIC | Age: 23
Discharge: HOME | End: 2023-12-14
Attending: EMERGENCY MEDICINE | Admitting: EMERGENCY MEDICINE
Payer: COMMERCIAL

## 2023-12-14 VITALS
HEART RATE: 83 BPM | SYSTOLIC BLOOD PRESSURE: 128 MMHG | TEMPERATURE: 98.3 F | DIASTOLIC BLOOD PRESSURE: 76 MMHG | OXYGEN SATURATION: 99 % | RESPIRATION RATE: 16 BRPM

## 2023-12-14 DIAGNOSIS — E83.39 HYPOPHOSPHATEMIA: ICD-10-CM

## 2023-12-14 DIAGNOSIS — E16.2 HYPOGLYCEMIA: ICD-10-CM

## 2023-12-14 LAB
ALBUMIN SERPL BCG-MCNC: 4.8 G/DL (ref 3.5–5.2)
ALP SERPL-CCNC: 48 U/L (ref 40–150)
ALT SERPL W P-5'-P-CCNC: 16 U/L (ref 0–50)
ANION GAP SERPL CALCULATED.3IONS-SCNC: 10 MMOL/L (ref 7–15)
AST SERPL W P-5'-P-CCNC: 23 U/L (ref 0–45)
BILIRUB SERPL-MCNC: 0.3 MG/DL
BUN SERPL-MCNC: 10.6 MG/DL (ref 6–20)
CALCIUM SERPL-MCNC: 9.4 MG/DL (ref 8.6–10)
CHLORIDE SERPL-SCNC: 108 MMOL/L (ref 98–107)
CREAT SERPL-MCNC: 0.67 MG/DL (ref 0.51–0.95)
DEPRECATED HCO3 PLAS-SCNC: 23 MMOL/L (ref 22–29)
EGFRCR SERPLBLD CKD-EPI 2021: >90 ML/MIN/1.73M2
GLUCOSE BLDC GLUCOMTR-MCNC: 148 MG/DL (ref 70–99)
GLUCOSE SERPL-MCNC: 131 MG/DL (ref 70–99)
HOLD SPECIMEN: NORMAL
MAGNESIUM SERPL-MCNC: 2.1 MG/DL (ref 1.7–2.3)
PHOSPHATE SERPL-MCNC: 1.5 MG/DL (ref 2.5–4.5)
POTASSIUM SERPL-SCNC: 3.7 MMOL/L (ref 3.4–5.3)
PROT SERPL-MCNC: 7.6 G/DL (ref 6.4–8.3)
SODIUM SERPL-SCNC: 141 MMOL/L (ref 135–145)

## 2023-12-14 PROCEDURE — 99284 EMERGENCY DEPT VISIT MOD MDM: CPT | Performed by: EMERGENCY MEDICINE

## 2023-12-14 PROCEDURE — 250N000013 HC RX MED GY IP 250 OP 250 PS 637: Performed by: EMERGENCY MEDICINE

## 2023-12-14 PROCEDURE — 99283 EMERGENCY DEPT VISIT LOW MDM: CPT | Performed by: EMERGENCY MEDICINE

## 2023-12-14 PROCEDURE — 80053 COMPREHEN METABOLIC PANEL: CPT | Performed by: EMERGENCY MEDICINE

## 2023-12-14 PROCEDURE — 83735 ASSAY OF MAGNESIUM: CPT | Performed by: EMERGENCY MEDICINE

## 2023-12-14 PROCEDURE — 82962 GLUCOSE BLOOD TEST: CPT

## 2023-12-14 PROCEDURE — 84100 ASSAY OF PHOSPHORUS: CPT | Performed by: EMERGENCY MEDICINE

## 2023-12-14 PROCEDURE — 36415 COLL VENOUS BLD VENIPUNCTURE: CPT | Performed by: EMERGENCY MEDICINE

## 2023-12-14 RX ADMIN — POTASSIUM & SODIUM PHOSPHATES POWDER PACK 280-160-250 MG 3 PACKET: 280-160-250 PACK at 23:04

## 2023-12-14 ASSESSMENT — ACTIVITIES OF DAILY LIVING (ADL)
ADLS_ACUITY_SCORE: 33
ADLS_ACUITY_SCORE: 35

## 2023-12-15 NOTE — ED PROVIDER NOTES
"    [facility] [facility] DE[facility]MENT ([facility])    12/14/23       History     Chief Complaint   Patient presents with    Abnormal Labs    Fatigue     The history is provided by the patient, medical records and a significant other.     Victoria Solares is a 23 year old female who with PMH of anxiety and panic attacks presents to the ED for abnormal labs and fatigue.     As per triage note, patients glucose was 54 on lab tests today. Patient reports of feeling cold the last few days, shakiness, fatigue and weakness this week. Patient states she ate one hour prior to blood test today (bagel with toppings). Patient took 1 mg of lorazepam after talking with nurse due to anxiety. Patient lost her sister this fall (at age 25 due to \"odd circumstances\") and she is so very anxious about this. Denies SOB. She also states she has chronic chest pain but is not currently having any chest pain. BG in triage is 148. Patient is supposed to go to [place] for a trip tomorrow and is very concerned about this occurring again or why this occurred randomly.     Patient had a glucose test done recently which showed a blood sugar of 55. Patient has been feeling fatigued and weak for the past few days. Patient was feeling cold, shaky, irritable and nervous. She was also feeling a tingling sensation, but not anymore.  Patient had Covid a month ago and was feeling weak as well. It was difficult for her to wake up this morning and her whole body was feeling really strange. After her blood test, she was feeling very tired. Patient reports that she had three episodes of bowel movements today and felt that it was unusual; also had an episode of diarrhea a week ago.  No history of diabetes or other blood sugar issues.    Family history: She recently had a sister who passed away due to multiple pulmonary emboli.  Also, her father had a hear attack. Her grandmother had lupus and DM, aunts have graves disease and her mother has tested " positive for JAY and Raynaud's; patient suspects she may have Raynaud's as hands get stuck in certain positions.     Medication history: Patient is on Mirena and she takes hydroxyzine and lorazepam as needed and she takes fluoxetine daily. Denies any problems with low blood sugar before. Denies any family history of clotting disorders.     Past Medical History  Past Medical History:   Diagnosis Date    Anxiety     Panic attacks      Past Surgical History:   Procedure Laterality Date    TONSILLECTOMY       etonogestrel-ethinyl estradiol (NUVARING) 0.12-0.015 MG/24HR vaginal ring  FLUOXETINE HCL PO  HYDROXYZINE HCL PO  levonorgestrel (MIRENA, 52 MG,) 20 MCG/24HR IUD      Allergies   Allergen Reactions    Azithromycin Rash     Family History  No family history on file.  Social History   Social History     Tobacco Use    Smoking status: Never    Smokeless tobacco: Never   Substance Use Topics    Alcohol use: No    Drug use: No      Past medical history, past surgical history, medications, allergies, family history, and social history were reviewed with the patient. No additional pertinent items.      A complete review of systems was performed with pertinent positives and negatives noted in the HPI, and all other systems negative.    Physical Exam   BP: 135/82  Pulse: (!) 124  Temp: 98.3  F (36.8  C)  Resp: 16  SpO2: 100 %  Physical Exam  Vitals and nursing note reviewed.   Constitutional:       General: She is in acute distress.      Appearance: Normal appearance.   HENT:      Head: Normocephalic.      Nose: Nose normal.   Eyes:      Pupils: Pupils are equal, round, and reactive to light.   Cardiovascular:      Rate and Rhythm: Normal rate and regular rhythm.   Pulmonary:      Effort: Pulmonary effort is normal.   Abdominal:      General: There is no distension.   Musculoskeletal:         General: No deformity. Normal range of motion.      Cervical back: Normal range of motion.   Skin:     General: Skin is warm.    Neurological:      Mental Status: She is alert and oriented to person, place, and time.   Psychiatric:         Mood and Affect: Mood is anxious.         ED Course, Procedures, & Data    8:47 PM  The patient was seen and examined by Derek Cannon DO. in Room VTA..         Results for orders placed or performed during the hospital encounter of 12/14/23   Glucose by meter     Status: Abnormal   Result Value Ref Range    GLUCOSE BY METER POCT 148 (H) 70 - 99 mg/dL   Comprehensive metabolic panel     Status: Abnormal   Result Value Ref Range    Sodium 141 135 - 145 mmol/L    Potassium 3.7 3.4 - 5.3 mmol/L    Carbon Dioxide (CO2) 23 22 - 29 mmol/L    Anion Gap 10 7 - 15 mmol/L    Urea Nitrogen 10.6 6.0 - 20.0 mg/dL    Creatinine 0.67 0.51 - 0.95 mg/dL    GFR Estimate >90 >60 mL/min/1.73m2    Calcium 9.4 8.6 - 10.0 mg/dL    Chloride 108 (H) 98 - 107 mmol/L    Glucose 131 (H) 70 - 99 mg/dL    Alkaline Phosphatase 48 40 - 150 U/L    AST 23 0 - 45 U/L    ALT 16 0 - 50 U/L    Protein Total 7.6 6.4 - 8.3 g/dL    Albumin 4.8 3.5 - 5.2 g/dL    Bilirubin Total 0.3 <=1.2 mg/dL   Magnesium     Status: Normal   Result Value Ref Range    Magnesium 2.1 1.7 - 2.3 mg/dL   Phosphorus     Status: Abnormal   Result Value Ref Range    Phosphorus 1.5 (L) 2.5 - 4.5 mg/dL   [place] Draw     Status: None    Narrative    The following orders were created for panel order [place] Draw.  Procedure                               Abnormality         Status                     ---------                               -----------         ------                     Extra Purple Top Tube[[id number]]                            Final result                 Please view results for these tests on the individual orders.   Extra Purple Top Tube     Status: None   Result Value Ref Range    Hold Specimen JI      Medications   potassium & sodium phosphates (NEUTRA-PHOS) Packet 3 packet (3 packets Oral $Given 12/14/23 4862)     Labs Ordered and Resulted from Time  of ED Arrival to Time of ED Departure   GLUCOSE BY METER - Abnormal       Result Value    GLUCOSE BY METER POCT 148 (*)    COMPREHENSIVE METABOLIC PANEL - Abnormal    Sodium 141      Potassium 3.7      Carbon Dioxide (CO2) 23      Anion Gap 10      Urea Nitrogen 10.6      Creatinine 0.67      GFR Estimate >90      Calcium 9.4      Chloride 108 (*)     Glucose 131 (*)     Alkaline Phosphatase 48      AST 23      ALT 16      Protein Total 7.6      Albumin 4.8      Bilirubin Total 0.3     PHOSPHORUS - Abnormal    Phosphorus 1.5 (*)    MAGNESIUM - Normal    Magnesium 2.1       No orders to display          Critical care was not performed.     Medical Decision Making  The patient's presentation was of moderate complexity (an undiagnosed new problem with uncertain diagnosis).    The patient's evaluation involved:  ordering and/or review of 3+ test(s) in this encounter (see separate area of note for details)    The patient's management necessitated only low risk treatment.    Assessment & Plan    Patient presents the ED for report of blood sugar of 54 in the outpatient setting.  Patient endorses that she has been generally fatigued/weak recently.  However, she is going through grieving process as she recently lost her sister.  She has no history of diabetes or other blood sugar issues previously.    On arrival, she has normal vital signs.  Overall appears nontoxic.  Non focal neuro exam.  Otherwise unremarkable physical exam    Differential diagnosis includes hypoglycemia due to decreased p.o. intake, lab error, insulinoma, adrenal crisis, other electrolyte abnormality.  No new medications, unlikely drug reaction.    Labs ordered/reviewed.  Metabolic panel shows normal electrolytes.  Renal function is 0.67.  Magnesium level within normal limits.  Phosphorus is decreased at 1.5 and was replaced in the ED.  Patient plans to start a multivitamin.    Reviewed CBC from the outpatient setting which is unremarkable.    Glucose x  2 in the ED has been 148 and 131.  Patient is tolerating p.o.    Given normal electrolytes and normal blood pressure, low suspicion for adrenal insufficiency.  Insulinoma like slightly but can be workup in outpatient setting should she develop recurrent hypoglycemia.    Likely diagnosis is decreased p.o. intake versus possible lab error.  Regardless, we discussed signs and symptoms of hypoglycemia for which to monitor at home and reasons to return emergently to the ED.  Should this be a recurrent issue, she would need additional workup likely in outpatient setting.    Findings and plan of care discussed with patient and mom who is at bedside.    I have reviewed the nursing notes. I have reviewed the findings, diagnosis, plan and need for follow up with the patient.    Discharge Medication List as of 12/14/2023 10:45 PM          Final diagnoses:   Hypophosphatemia   Hypoglycemia   I, WILDER LOVING, am serving as a trained medical scribe to document services personally performed by Derek Cannon DO, based on the provider's statements to me.     IDerek DO, was physically present and have reviewed and verified the accuracy of this note documented by WILDER LOVING.     Derek Cannon DO.   [facility] EMERGENCY DEPARTMENT  12/14/2023     Derek Cannon DO  12/15/23 0134

## 2023-12-15 NOTE — DISCHARGE INSTRUCTIONS
Your low blood sugar seems to have resolved.  It is very important that you eat a balanced diet and pay close attention to your symptoms.  It is possible that this could recur.  Be sure to see your doctor or return to the nearest ED should you develop symptoms of hypoglycemia (confusion, lightheadedness, dizziness, lethargy).  If you develop the symptoms, you should ingest some form of sugar/carbohydrate such as orange juice.    You also had a low phosphorus level.  He did not need to be started on any specific medications for this but should be controlled with balanced diet and a daily multivitamin.    Be sure to follow-up with your doctor in the next week for a recheck of your metabolic panel.    Return to the ED with any new or worsening symptoms.

## 2023-12-15 NOTE — ED TRIAGE NOTES
"Glucose was 54 on lab tests today - pt c/o feeling cold the last few days, shakiness, fatigue and weakness this week  Pt states she ate 1 hr prior to blood test today (bagel with toppings)  Pt took 1 mg lorazepam after talking w/nurse d/t anxiety. Pt lost sister this fall (at age 25 d/t \"odd circumstances\") so very anxious about this.     Denies SOB  States she has chronic chest pain but is not currently having any chest pain    BG in triage is 148 - pt supposed to leave on trip tomorrow and is very concerned about this occurring again or why this occurred randomly.             "

## 2024-07-13 ENCOUNTER — HEALTH MAINTENANCE LETTER (OUTPATIENT)
Age: 24
End: 2024-07-13

## 2024-11-22 ENCOUNTER — HOSPITAL ENCOUNTER (EMERGENCY)
Facility: HOSPITAL | Age: 24
Discharge: HOME | End: 2024-11-22
Attending: EMERGENCY MEDICINE | Admitting: EMERGENCY MEDICINE
Payer: COMMERCIAL

## 2024-11-22 VITALS
DIASTOLIC BLOOD PRESSURE: 60 MMHG | SYSTOLIC BLOOD PRESSURE: 130 MMHG | BODY MASS INDEX: 22.15 KG/M2 | RESPIRATION RATE: 16 BRPM | HEIGHT: 63 IN | WEIGHT: 125 LBS | OXYGEN SATURATION: 98 % | HEART RATE: 93 BPM | TEMPERATURE: 100.8 F

## 2024-11-22 DIAGNOSIS — O00.102 LEFT TUBAL PREGNANCY WITHOUT INTRAUTERINE PREGNANCY: ICD-10-CM

## 2024-11-22 DIAGNOSIS — R50.9 FEVER, UNSPECIFIED FEVER CAUSE: ICD-10-CM

## 2024-11-22 LAB
ABO/RH(D): NORMAL
ALBUMIN UR-MCNC: NEGATIVE MG/DL
ANTIBODY SCREEN: NEGATIVE
APPEARANCE UR: CLEAR
BACTERIA #/AREA URNS HPF: ABNORMAL /HPF
BILIRUB UR QL STRIP: NEGATIVE
COLOR UR AUTO: COLORLESS
FLUAV RNA SPEC QL NAA+PROBE: NEGATIVE
FLUBV RNA RESP QL NAA+PROBE: NEGATIVE
GLUCOSE UR STRIP-MCNC: NEGATIVE MG/DL
GROUP A STREP BY PCR: NOT DETECTED
HGB UR QL STRIP: ABNORMAL
KETONES UR STRIP-MCNC: 40 MG/DL
LEUKOCYTE ESTERASE UR QL STRIP: NEGATIVE
MUCOUS THREADS #/AREA URNS LPF: PRESENT /LPF
NITRATE UR QL: NEGATIVE
PH UR STRIP: 7.5 [PH] (ref 5–7)
RBC URINE: 4 /HPF
RSV RNA SPEC NAA+PROBE: NEGATIVE
SARS-COV-2 RNA RESP QL NAA+PROBE: NEGATIVE
SP GR UR STRIP: 1.01 (ref 1–1.03)
SPECIMEN EXPIRATION DATE: NORMAL
SQUAMOUS EPITHELIAL: <1 /HPF
UROBILINOGEN UR STRIP-MCNC: <2 MG/DL
WBC URINE: 1 /HPF

## 2024-11-22 PROCEDURE — 99291 CRITICAL CARE FIRST HOUR: CPT | Mod: 25

## 2024-11-22 PROCEDURE — 96376 TX/PRO/DX INJ SAME DRUG ADON: CPT

## 2024-11-22 PROCEDURE — 96374 THER/PROPH/DIAG INJ IV PUSH: CPT

## 2024-11-22 PROCEDURE — 36415 COLL VENOUS BLD VENIPUNCTURE: CPT | Performed by: EMERGENCY MEDICINE

## 2024-11-22 PROCEDURE — 250N000013 HC RX MED GY IP 250 OP 250 PS 637: Performed by: EMERGENCY MEDICINE

## 2024-11-22 PROCEDURE — 96401 CHEMO ANTI-NEOPL SQ/IM: CPT

## 2024-11-22 PROCEDURE — 250N000011 HC RX IP 250 OP 636: Performed by: EMERGENCY MEDICINE

## 2024-11-22 PROCEDURE — 250N000011 HC RX IP 250 OP 636: Performed by: OBSTETRICS & GYNECOLOGY

## 2024-11-22 PROCEDURE — 86900 BLOOD TYPING SEROLOGIC ABO: CPT | Performed by: EMERGENCY MEDICINE

## 2024-11-22 PROCEDURE — 87637 SARSCOV2&INF A&B&RSV AMP PRB: CPT | Performed by: EMERGENCY MEDICINE

## 2024-11-22 PROCEDURE — 87651 STREP A DNA AMP PROBE: CPT | Performed by: EMERGENCY MEDICINE

## 2024-11-22 PROCEDURE — 81001 URINALYSIS AUTO W/SCOPE: CPT | Performed by: EMERGENCY MEDICINE

## 2024-11-22 PROCEDURE — 96375 TX/PRO/DX INJ NEW DRUG ADDON: CPT

## 2024-11-22 RX ORDER — ACETAMINOPHEN 325 MG/1
650 TABLET ORAL ONCE
Status: COMPLETED | OUTPATIENT
Start: 2024-11-22 | End: 2024-11-22

## 2024-11-22 RX ORDER — MORPHINE SULFATE 2 MG/ML
2 INJECTION, SOLUTION INTRAMUSCULAR; INTRAVENOUS ONCE
Status: COMPLETED | OUTPATIENT
Start: 2024-11-22 | End: 2024-11-22

## 2024-11-22 RX ORDER — LORAZEPAM 2 MG/ML
0.5 INJECTION INTRAMUSCULAR ONCE
Status: COMPLETED | OUTPATIENT
Start: 2024-11-22 | End: 2024-11-22

## 2024-11-22 RX ORDER — METHOTREXATE 25 MG/ML
50 INJECTION INTRA-ARTERIAL; INTRAMUSCULAR; INTRATHECAL; INTRAVENOUS ONCE
Status: COMPLETED | OUTPATIENT
Start: 2024-11-22 | End: 2024-11-22

## 2024-11-22 RX ADMIN — LORAZEPAM 0.5 MG: 2 INJECTION INTRAMUSCULAR; INTRAVENOUS at 17:48

## 2024-11-22 RX ADMIN — MORPHINE SULFATE 2 MG: 2 INJECTION, SOLUTION INTRAMUSCULAR; INTRAVENOUS at 18:34

## 2024-11-22 RX ADMIN — METHOTREXATE 79.5 MG: 25 INJECTION, SOLUTION INTRA-ARTERIAL; INTRAMUSCULAR; INTRATHECAL; INTRAVENOUS at 21:13

## 2024-11-22 RX ADMIN — ACETAMINOPHEN 650 MG: 325 TABLET ORAL at 20:53

## 2024-11-22 RX ADMIN — LORAZEPAM 0.5 MG: 2 INJECTION INTRAMUSCULAR; INTRAVENOUS at 18:32

## 2024-11-22 ASSESSMENT — ACTIVITIES OF DAILY LIVING (ADL)
ADLS_ACUITY_SCORE: 0

## 2024-11-22 ASSESSMENT — COLUMBIA-SUICIDE SEVERITY RATING SCALE - C-SSRS
1. IN THE PAST MONTH, HAVE YOU WISHED YOU WERE DEAD OR WISHED YOU COULD GO TO SLEEP AND NOT WAKE UP?: NO
2. HAVE YOU ACTUALLY HAD ANY THOUGHTS OF KILLING YOURSELF IN THE PAST MONTH?: NO

## 2024-11-22 NOTE — ED TRIAGE NOTES
Patient arrives via EMS for abdominal pain that began yesterday. Patient states she developed bright red vaginal bleeding today, and then began to have severe L sided abdominal pain this afternoon. [facility] which found her to be pregnant and ultrasound with concerns for ectopic pregnancy. Patient did not know she was pregnant and has an IUD in place.      Triage Assessment (Adult)       Row Name 11/22/24 170          Triage Assessment    Airway WDL WDL        Respiratory WDL    Respiratory WDL WDL        Skin Circulation/Temperature WDL    Skin Circulation/Temperature WDL WDL        Cardiac WDL    Cardiac WDL WDL        Peripheral/Neurovascular WDL    Peripheral Neurovascular WDL WDL

## 2024-11-22 NOTE — ED NOTES
Bed: JNED-01  Expected date: 11/22/24  Expected time: 5:04 PM  Means of arrival: Ambulance  Comments:  [facility] 24 [facility] referral

## 2024-11-22 NOTE — ED NOTES
Expected Patient Referral to ED  4:37 PM    Referring Clinic/Provider:  [facility]    Reason for referral/Clinical facts:  Concern for ruptured ectopic.  Ultrasound with free fluid and mass.  HR low 100s, normotensive    Recommendations provided:  Send to ED for further evaluation    Caller was informed that this institution does possess the capabilities and/or resources to provide for patient and should be transferred to our facility.    Discussed that if direct admit is sought and any hurdles are encountered, this ED would be happy to see the patient and evaluate.    Informed caller that recommendations provided are recommendations based only on the facts provided and that they responsible to accept or reject the advice, or to seek a formal in person consultation as needed and that this ED will see/treat patient should they arrive.      Jazmin Kirby DO  [facility] EMERGENCY DEPARTMENT  [address] [place] [id number]  [phone]       Jazmin Kirby DO  11/22/24 2792

## 2024-11-23 NOTE — CONSULTS
CONSULTATION - GYN     NAME: Victoria Solares   : 2000   MRN: [id number]      [facility]    ADMISSION DATE: 2024    PCP:  Donya Crespo     CHIEF COMPLAINT: adnexal mass, left lower quadrant pain    HPI: I have been requested by Dr. Fleming to evaluate Victoria Solares for an adnexal mass . Patient is a 24 year old,  female that presented to the hospital for left lower quadrant pain. History is obtained through the patient and chart review. Patient reports that she has had the pain for 2 days. It was tolerable yesterday but today was accompanied by heavy vaginal bleeding.  When this occurred, she presented to the [facility] where she felt the pain had improved however was found to have a positive beta hcg and ultrasound concerning for ectopic pregnancy.       GYN/Menstral History: Patient is not at increased risk for sexually transmitted disease. No LMP recorded. (Menstrual status: IUD).     PAST MEDICAL HISTORY:  Past Medical History:   Diagnosis Date    Anxiety     Panic attacks        PAST SURGICAL HISTORY:  Past Surgical History:   Procedure Laterality Date    TONSILLECTOMY         SOCIAL HISTORY:  Social History     Socioeconomic History    Marital status: Single     Spouse name: Not on file    Number of children: Not on file    Years of education: Not on file    Highest education level: Not on file   Occupational History    Not on file   Tobacco Use    Smoking status: Never    Smokeless tobacco: Never   Substance and Sexual Activity    Alcohol use: No    Drug use: No    Sexual activity: Yes     Partners: Male     Birth control/protection: Other   Other Topics Concern    Not on file   Social History Narrative    Not on file     Social Drivers of Health     Financial Resource Strain: Not At Risk (2023)    [facility]    Financial Resource Strain     Is it hard for you to pay for the very basics like food, housing, medical care or heating?: No  "  Food Insecurity: Not At Risk (11/28/2023)    [facility]    Food Insecurity     Does your food run out before you have the money to buy more?: No   Transportation Needs: Not At Risk (11/28/2023)    Received from [facility]    Transportation Needs     Does a lack of transportation keep you from your medical appointments or from getting your medications?: No   Physical Activity: Not on file   Stress: Not on file   Social Connections: Unknown (3/11/2022)    Received from [facility]    Social Connections     Frequency of Communication with Friends and Family: Not on file   Interpersonal Safety: Not on file   Housing Stability: Not on file       MEDICATIONS:  Current Facility-Administered Medications   Medication Dose Route Frequency Provider Last Rate Last Admin    methotrexate sodium (PF) injection 79.5 mg  50 mg/m2 Intramuscular Once Shilpa Malik, DO         Current Outpatient Medications   Medication Sig Dispense Refill    etonogestrel-ethinyl estradiol (NUVARING) 0.12-0.015 MG/24HR vaginal ring Place 1 each vaginally every 28 days      FLUOXETINE HCL PO Take 10 mg by mouth daily      HYDROXYZINE HCL PO Take by mouth 3 times daily as needed for itching      levonorgestrel (MIRENA, 52 MG,) 20 MCG/24HR IUD          ALLERGIES:  Allergies   Allergen Reactions    Azithromycin Rash       REVIEW OF SYSTEMS    Negative except what is stated in the HPI    PHYSICAL EXAM:  /77   Pulse 114   Temp 100  F (37.8  C) (Oral)   Resp 16   Ht 1.6 m (5' 3\")   Wt 56.7 kg (125 lb)   SpO2 97%   BMI 22.14 kg/m     General Appearance: Alert, appropriate appearance for age. No acute distress,   HEENT : Grossly normal  Chest/Respiratory: Normal chest wall and respirations.   Cardiovascular: Regular rate and rhythm   Pelvic Exam Female:  deferred,   Neurologic: Normal speech, no tremor  Psychiatric: Alert and oriented, appropriate " affect.    LABS  Lab Results   Component Value Date    HGB 13.4 06/05/2023     Outside labs reviewed:  Beta hcg 1183  Hgb 14.9    Lab Results: personally reviewed.     IMAGING:  US Pelvic Transabdominal and Transvaginal  Order: [id number]  Impression    1.  No intrauterine pregnancy.    2.  Intrauterine device within the endometrium obscures detail.    3.  Mass left adnexa highly suggestive of an ectopic pregnancy.    4.  Tiny amount of free fluid in the pelvis.    Critical Result: Ectopic pregnancy    Finding was identified on 11/22/2024 4:50 PM CST.    Dr. Gtz was contacted by me on 11/22/2024 4:56 PM CST and verbalized understanding of the critical result.  Narrative    For Patients: As a result of the 21st Century Cures Act, medical imaging exams and procedure reports are released immediately into your electronic medical record. You may view this report before your referring provider. If you have questions, please contact your health care provider.    EXAM: US PELVIS COMPLETE TA AND TV WITH DUPLEX  LOCATION: [facility] [place]  DATE: 11/22/2024    INDICATION: Abnormal bleeding. Tenderness. Beta hCG 1183  COMPARISON: None.  TECHNIQUE: Transabdominal scans were performed. Endovaginal ultrasound was performed to better visualize the adnexa. Color flow with spectral Doppler and waveform analysis performed.    FINDINGS:    UTERUS: 8.4 x 3.0 x 3.8 cm. No masses.    ENDOMETRIUM: Intrauterine device in good position. This obscures the endometrium.    RIGHT OVARY: 4.2 x 2.2 x 3.0 cm. Normal with arterial and venous duplex flow identified.    LEFT OVARY: 2.7 x 1.9 x 2.7 cm. [There is normal with normal flow. Adjacent to the ovary there is a 5.0 x 2.1 x 1.9 cm poorly defined mass with central tiny cystic area concerning for ectopic pregnancy.    No significant free fluid.  Exam End: 11/22/24  4:33 PM    Specimen Collected: 11/22/24  4:33 PM Last Resulted: 11/22/24  4:57 PM   Received From: [facility]  Result Received: 11/22/24  5:01 PM        Radiology Results: Personally reviewed impression/s    IMPRESSION:  24 year old female with adnexal mass which is concerning for ectopic pregnancy  IUD failure    RECOMMENDATIONS:  Discussed findings with patient and her family members, including intrauterine IUD, beta hcg below the discriminatory zone, ultrasound suspicious for tubal pregnancy.    Discussed the options including expectant management (not recommended), methotrexate management and surgical management.  We reviewed the risks and benefits of each of the options. Her questions were answered and she was given time to review her options, after which she elected for methotrexate management.     She was given a handout with information on methotrexate and the treatment of ectopic pregnancy, as well as a schedule of when she needs follow up labs and where to go.  Her day 7 draw falls on Thanksgiving, therefore she will plan to return to the ED for labs and potentially a 2nd dose of methotrexate if indicated.  She knows the warning signs for ectopic pregnancy rupture and will return to the ED should this be suspected.     Thank you for allowing us to participate in the care of this patient.  Please contact us with any questions/concerns.      Shilpa Malik DO, SARA, FACOG  [facility]       CC: Donya Crespo,

## 2024-11-23 NOTE — ED PROVIDER NOTES
EMERGENCY DEPARTMENT NOTE     Name: Victoria Solares    Age/Sex: 24 year old female   MRN: [id number]   Evaluation Date & Time:  11/22/2024  5:02 PM    PCP:    Donya Crespo   ED Provider: Glen Fleming D.O.       CHIEF COMPLAINT    Abdominal Pain     HISTORY OF PRESENT ILLNESS   Victoria Solares is a 24 year old year old female without significant past medical history who presents to the ED  for evaluation of left lower quadrant abdominal pain and vaginal bleeding.  Patient had episode of left lower quadrant abdominal pain with vaginal bleeding irregular for menses.  Currently has IUD in place.  Patient was found to have positive pregnancy test at emergency room with quantitative hCG 1186.  Hemoglobin 14.9.  Ultrasound showed left adnexal mass 5 x 2 x 2 cm concerning for ectopic no free fluid in the pelvis.  Patient currently denying significant abdominal pain.  Patient felt somewhat lightheaded during episode of vaginal bleeding which has not been recurrent but no syncope.  No current abdominal pain.  Patient on other review of systems had URI symptoms earlier this week which have resolved with mild sore throat.  No cough, shortness of breath or chest pain.  Patient denied urinary tract symptoms including dysuria or urinary frequency.      DIAGNOSIS & DISPOSITION/MEDICAL DECISION MAKING     1. Left tubal pregnancy without intrauterine pregnancy    2. Fever, unspecified fever cause        EMERGENCY DEPARTMENT COURSE   7:26 PM I met with the patient to gather history and to perform my initial exam.  We discussed treatment options and the plan for care while in the Emergency Department.  Triage vital signs: /69 pulse 99 respiratory 16 temp 100 SpO2 RA 99%  Differential diagnosis considered included but not limited to:    MDM:  ED Course as of 11/22/24 2202 Fri Nov 22, 2024 1805 Discussed with Dr. Malik for OB/GYN.  Will have ultrasound images pushed and she will review   1930 Discussed with Dr. Malik for  "OB/GYN.  Patient is making decision whether to have surgery or methotrexate for ectopic   2031 Decision was made for methotrexate.  Patient was noted to have low-grade temperature 100.8 and will obtain rapid strep, COVID/influenza PCR with mild URI or sore throat symptoms in the past.  Will also check urinalysis.  Patient will have outpatient labs on Monday through the lab monitoring liver function test but on Thursday will need to return to the emergency department since the lab will be closed for outpatient laboratory evaluation.   2201 Urinalysis negative for evidence of urinary tract infection, group A strep, influenza, COVID PCR negative.  Serial abdominal exams with no localized tenderness, patient is remained hemodynamically stable.  Patient was initiated on methotrexate.  She will follow-up on Monday for monitoring labs but return to the emergency department on Thursday for repeat monitoring labs since the outpatient lab will be closed for Thanksgiving.  Return criteria were discussed with the patient has abdominal pain not improved with Tylenol or ibuprofen, heavy vaginal bleeding greater than 1 pad per hour, feels faint or has actual fainting will return to the emergency department.     Discharge Vital Signs:/60   Pulse 93   Temp (!) 100.8  F (38.2  C) (Oral)   Resp 16   Ht 1.6 m (5' 3\")   Wt 56.7 kg (125 lb)   SpO2 98%   BMI 22.14 kg/m     PROCEDURES:   None  Diagnostic studies:  No orders to display     Labs Ordered and Resulted from Time of ED Arrival to Time of ED Departure   ROUTINE UA WITH MICROSCOPIC REFLEX TO CULTURE - Abnormal       Result Value    Color Urine Colorless      Appearance Urine Clear      Glucose Urine Negative      Bilirubin Urine Negative      Ketones Urine 40 (*)     Specific Gravity Urine 1.007      Blood Urine 1.0 mg/dL (*)     pH Urine 7.5 (*)     Protein Albumin Urine Negative      Urobilinogen Urine <2.0      Nitrite Urine Negative      Leukocyte Esterase Urine " Negative      Bacteria Urine Few (*)     Mucus Urine Present (*)     RBC Urine 4 (*)     WBC Urine 1      Squamous Epithelials Urine <1     INFLUENZA A/B, RSV AND SARS-COV2 PCR - Normal    Influenza A PCR Negative      Influenza B PCR Negative      RSV PCR Negative      SARS CoV2 PCR Negative     GROUP A STREPTOCOCCUS PCR THROAT SWAB - Normal    Group A strep by PCR Not Detected     TYPE AND SCREEN, ADULT    ABO/RH(D) A POS      Antibody Screen Negative      SPECIMEN EXPIRATION DATE [id number]     ABO/RH TYPE AND SCREEN     ED INTERVENTIONS     Medications   LORazepam (ATIVAN) injection 0.5 mg (0.5 mg Intravenous $Given 11/22/24 1748)   morphine (PF) injection 2 mg (2 mg Intravenous $Given 11/22/24 1834)   LORazepam (ATIVAN) injection 0.5 mg (0.5 mg Intravenous $Given 11/22/24 1832)   methotrexate sodium (PF) injection 79.5 mg (79.5 mg Intramuscular $Given 11/22/24 2113)   acetaminophen (TYLENOL) tablet 650 mg (650 mg Oral $Given 11/22/24 2053)     TOTAL CRITICAL CARE TIME (EXCLUDING PROCEDURES): 30 minutes for management of ectopic pregnancy      DISCHARGE MEDICATIONS        Review of your medicines        UNREVIEWED medicines. Ask your doctor about these medicines        Dose / Directions   etonogestrel-ethinyl estradiol 0.12-0.015 MG/24HR vaginal ring  Commonly known as: NUVARING      Dose: 1 each  Place 1 each vaginally every 28 days  Refills: 0     FLUOXETINE HCL PO      Dose: 10 mg  Take 10 mg by mouth daily  Refills: 0     HYDROXYZINE HCL PO      Take by mouth 3 times daily as needed for itching  Refills: 0     Mirena (52 MG) 20 MCG/24HR IUD  Used for: Thrombocytopenia (H)  Generic drug: levonorgestrel      Refills: 0            DISPOSITION: Home    Medical Decision Making    At the time of my evaluation, I do not feel the patient s symptoms are caused by sepsis      I obtained additional history from these independent historians:  Patient's father and significant other  I reviewed these outside  records:  Emergency room visit from earlier today  I noted these abnormal vital signs / labs:  Temperature 100.8, pulse 114    Monitor Strip Interpretation:  Sinus rhythm  12-Lead ECG Interpretation:  NA  I independently reviewed the following diagnostic studies:  Pelvic ultrasound with left adnexal mass  I spoke to the following clinicians regard the patients care:  Dr. Malik OB/GYN  My disposition decision is based on the following reasons:    Discharge: I considered admission but discharged the patient after current workup and patient's clinical course in the emergency department.  Prescription medications prescribed :NA        At the conclusion of the encounter I discussed the results of all of the tests and the disposition. The questions were answered. The patient or family acknowledged understanding and was agreeable with the care plan.            INFORMATION SOURCE AND LIMITATIONS    History/Exam limitations: None  Patient information was obtained from: Patient, her significant other and father  Use of : N/A        REVIEW OF SYSTEMS:   All other systems reviewed and are negative except as noted above in HPI.    PATIENT HISTORY     Past Medical History:   Diagnosis Date    Anxiety     Panic attacks      There is no problem list on file for this patient.    Past Surgical History:   Procedure Laterality Date    TONSILLECTOMY         Allergies   Allergen Reactions    Azithromycin Rash       OUTPATIENT MEDICATIONS     New Prescriptions    No medications on file      Vitals:    11/22/24 2045 11/22/24 2105 11/22/24 2135 11/22/24 2145   BP: 121/72 134/75 124/70 130/60   Pulse: 92 99 99 93   Resp:       Temp:       TempSrc:       SpO2: 99% 100% 100% 98%   Weight:       Height:           Physical Exam   Constitutional: Oriented to person, place, and time. Appears well-developed and well-nourished.   Cardiovascular: Normal rate, regular rhythm and normal heart sounds.    Pulmonary/Chest: Normal effort  and  breath sounds normal.   Abdominal: Soft. Bowel sounds are normal.  Minimal left lower quadrant tenderness without guarding or peritoneal signs  Musculoskeletal: Normal range of motion.   Neurological: Alert and oriented to person, place, and time. Normal strengthSkin: Skin is warm and dry.   Psychiatric: Normal mood and affect. Behavior is normal. Thought content normal.     DIAGNOSTICS    LABORATORY FINDINGS (REVIEWED AND INTERPRETED):  Labs Ordered and Resulted from Time of ED Arrival to Time of ED Departure   ROUTINE UA WITH MICROSCOPIC REFLEX TO CULTURE - Abnormal       Result Value    Color Urine Colorless      Appearance Urine Clear      Glucose Urine Negative      Bilirubin Urine Negative      Ketones Urine 40 (*)     Specific Gravity Urine 1.007      Blood Urine 1.0 mg/dL (*)     pH Urine 7.5 (*)     Protein Albumin Urine Negative      Urobilinogen Urine <2.0      Nitrite Urine Negative      Leukocyte Esterase Urine Negative      Bacteria Urine Few (*)     Mucus Urine Present (*)     RBC Urine 4 (*)     WBC Urine 1      Squamous Epithelials Urine <1     INFLUENZA A/B, RSV AND SARS-COV2 PCR - Normal    Influenza A PCR Negative      Influenza B PCR Negative      RSV PCR Negative      SARS CoV2 PCR Negative     GROUP A STREPTOCOCCUS PCR THROAT SWAB - Normal    Group A strep by PCR Not Detected     TYPE AND SCREEN, ADULT    ABO/RH(D) A POS      Antibody Screen Negative      SPECIMEN EXPIRATION DATE [id number]     ABO/RH TYPE AND SCREEN         IMAGING (REVIEWED AND INTERPRETED):  No orders to display                   Glen Fleming D.O.  EMERGENCY MEDICINE   11/22/24  [facility] EMERGENCY DEPARTMENT  [address] [place] [id number]  [phone]  Dept: [phone]     Glen Fleming DO  11/22/24 2203

## 2024-11-23 NOTE — DISCHARGE INSTRUCTIONS
Follow-up with OB/GYN as scheduled.  Go to the outpatient lab on Monday for repeat labs but return to the emergency department on Thursday for repeat labs since the outpatient laboratory will be closed on Thursday for Thanksgiving.  Take ibuprofen 400 mg every 8 hours and Tylenol 650 m g every 6 hours for pain management.  If you have recurrent abdominal pain not improved with Tylenol ibuprofen, feel lightheaded or faint or have actual fainting or heavy vaginal bleeding greater than 1 pad per hour for several hours return to the emergency department.

## 2024-11-25 ENCOUNTER — LAB REQUISITION (OUTPATIENT)
Dept: LAB | Facility: CLINIC | Age: 24
End: 2024-11-25

## 2024-11-25 DIAGNOSIS — O00.90 UNSPECIFIED ECTOPIC PREGNANCY WITHOUT INTRAUTERINE PREGNANCY: ICD-10-CM

## 2024-11-25 PROCEDURE — 85004 AUTOMATED DIFF WBC COUNT: CPT | Performed by: OBSTETRICS & GYNECOLOGY

## 2024-11-25 PROCEDURE — 85048 AUTOMATED LEUKOCYTE COUNT: CPT | Performed by: OBSTETRICS & GYNECOLOGY

## 2024-11-26 LAB
BASOPHILS # BLD AUTO: 0.1 10E3/UL (ref 0–0.2)
BASOPHILS NFR BLD AUTO: 1 %
EOSINOPHIL # BLD AUTO: 0.1 10E3/UL (ref 0–0.7)
EOSINOPHIL NFR BLD AUTO: 2 %
ERYTHROCYTE [DISTWIDTH] IN BLOOD BY AUTOMATED COUNT: 12.1 % (ref 10–15)
HCT VFR BLD AUTO: 40 % (ref 35–47)
HGB BLD-MCNC: 13.7 G/DL (ref 11.7–15.7)
IMM GRANULOCYTES # BLD: 0 10E3/UL
IMM GRANULOCYTES NFR BLD: 0 %
LYMPHOCYTES # BLD AUTO: 1.5 10E3/UL (ref 0.8–5.3)
LYMPHOCYTES NFR BLD AUTO: 22 %
MCH RBC QN AUTO: 32.4 PG (ref 26.5–33)
MCHC RBC AUTO-ENTMCNC: 34.3 G/DL (ref 31.5–36.5)
MCV RBC AUTO: 95 FL (ref 78–100)
MONOCYTES # BLD AUTO: 0.3 10E3/UL (ref 0–1.3)
MONOCYTES NFR BLD AUTO: 5 %
NEUTROPHILS # BLD AUTO: 5.1 10E3/UL (ref 1.6–8.3)
NEUTROPHILS NFR BLD AUTO: 71 %
NRBC # BLD AUTO: 0 10E3/UL
NRBC BLD AUTO-RTO: 0 /100
PLATELET # BLD AUTO: 193 10E3/UL (ref 150–450)
RBC # BLD AUTO: 4.23 10E6/UL (ref 3.8–5.2)
WBC # BLD AUTO: 7.1 10E3/UL (ref 4–11)

## 2024-11-28 ENCOUNTER — APPOINTMENT (OUTPATIENT)
Dept: RADIOLOGY | Facility: HOSPITAL | Age: 24
End: 2024-11-28
Attending: EMERGENCY MEDICINE
Payer: COMMERCIAL

## 2024-11-28 ENCOUNTER — HOSPITAL ENCOUNTER (EMERGENCY)
Facility: HOSPITAL | Age: 24
Discharge: HOME | End: 2024-11-28
Attending: EMERGENCY MEDICINE
Payer: COMMERCIAL

## 2024-11-28 VITALS
DIASTOLIC BLOOD PRESSURE: 86 MMHG | RESPIRATION RATE: 16 BRPM | TEMPERATURE: 98.9 F | HEART RATE: 82 BPM | WEIGHT: 125 LBS | OXYGEN SATURATION: 99 % | BODY MASS INDEX: 22.15 KG/M2 | SYSTOLIC BLOOD PRESSURE: 110 MMHG | HEIGHT: 63 IN

## 2024-11-28 DIAGNOSIS — Z01.89 ROUTINE LAB DRAW: ICD-10-CM

## 2024-11-28 DIAGNOSIS — R07.9 CHEST PAIN, UNSPECIFIED TYPE: ICD-10-CM

## 2024-11-28 LAB
ERYTHROCYTE [DISTWIDTH] IN BLOOD BY AUTOMATED COUNT: 11.9 % (ref 10–15)
HCG INTACT+B SERPL-ACNC: 107 MIU/ML
HCT VFR BLD AUTO: 39.7 % (ref 35–47)
HGB BLD-MCNC: 14.2 G/DL (ref 11.7–15.7)
MCH RBC QN AUTO: 32.9 PG (ref 26.5–33)
MCHC RBC AUTO-ENTMCNC: 35.8 G/DL (ref 31.5–36.5)
MCV RBC AUTO: 92 FL (ref 78–100)
PLATELET # BLD AUTO: 196 10E3/UL (ref 150–450)
RBC # BLD AUTO: 4.32 10E6/UL (ref 3.8–5.2)
WBC # BLD AUTO: 3.8 10E3/UL (ref 4–11)

## 2024-11-28 PROCEDURE — 93005 ELECTROCARDIOGRAM TRACING: CPT | Performed by: EMERGENCY MEDICINE

## 2024-11-28 PROCEDURE — 85018 HEMOGLOBIN: CPT | Performed by: EMERGENCY MEDICINE

## 2024-11-28 PROCEDURE — 71046 X-RAY EXAM CHEST 2 VIEWS: CPT

## 2024-11-28 PROCEDURE — 36415 COLL VENOUS BLD VENIPUNCTURE: CPT | Performed by: EMERGENCY MEDICINE

## 2024-11-28 PROCEDURE — 99285 EMERGENCY DEPT VISIT HI MDM: CPT | Mod: 25

## 2024-11-28 PROCEDURE — 84702 CHORIONIC GONADOTROPIN TEST: CPT | Performed by: EMERGENCY MEDICINE

## 2024-11-28 ASSESSMENT — ACTIVITIES OF DAILY LIVING (ADL)
ADLS_ACUITY_SCORE: 41
ADLS_ACUITY_SCORE: 41

## 2024-11-28 ASSESSMENT — COLUMBIA-SUICIDE SEVERITY RATING SCALE - C-SSRS
1. IN THE PAST MONTH, HAVE YOU WISHED YOU WERE DEAD OR WISHED YOU COULD GO TO SLEEP AND NOT WAKE UP?: NO
2. HAVE YOU ACTUALLY HAD ANY THOUGHTS OF KILLING YOURSELF IN THE PAST MONTH?: NO
6. HAVE YOU EVER DONE ANYTHING, STARTED TO DO ANYTHING, OR PREPARED TO DO ANYTHING TO END YOUR LIFE?: NO

## 2024-11-28 NOTE — DISCHARGE INSTRUCTIONS
Likely your testing shows evidence that the methotrexate is working.  You should follow-up in the OB clinic in 1 week for repeat lab draw.  As of any heart attack or pneumonia on your testing today  I would continue Tylenol or ibuprofen for your symptoms  Return if any acute worsening symptoms, passing out episodes or any other concerns

## 2024-11-28 NOTE — ED PROVIDER NOTES
EMERGENCY DEPARTMENT ENCOUNTER      NAME: Victoria Solares  AGE: 24 year old female  YOB: 2000  MRN: [id number]  EVALUATION DATE & TIME: No admission date for patient encounter.    PCP: Donya Crespo    ED PROVIDER: Jazmin Kirby DO      Chief Complaint   Patient presents with    RECHECK         FINAL IMPRESSION:  1. Routine lab draw    2. Chest pain, unspecified type          ED COURSE & MEDICAL DECISION MAKING:    Pertinent Labs & Imaging studies reviewed. (See chart for details)  10:53 AM I met the patient and performed my initial interview and exam.  12:34 PM I discussed with OB.  She should have another lab draw in their clinic in one week.    24 year old female presents to the Emergency Department for evaluation of repeat hCG drawn.  Patient seen on 11/22 for left lower quadrant abdominal pain.  Elevated hCG and ultrasound concerning for ectopic.  She elected for methotrexate.  Initial hCG was 1183.  She had a day four draw that was 289.  She notes lower abdominal pain is improving.  Her exam is with a soft abdomen with no significant pain.  She has been having some left-sided chest pain.  She did have a fever when she was last seen in the emergency department but feels that is gone away but has been taking Tylenol pretty regularly.  No family history of ACS.  No new PE risk factors.  No leg swelling.  X-ray of the chest without evidence of cardiomegaly, pneumonitis, pneumonia or pneumothorax.  EKG unremarkable.  I do not think that troponin is indicated at this time.  Low risk well's, d-dimer deferred.  Labs are without acute anemia.  hCG down to 107.  Discussed with OB/GYN.  This is a sufficient drop.  They recommend a repeat draw in 1 week at their clinic.  Discussed this with patient.  We discussed symptomatic care for home and return precautions    At the conclusion of the encounter I discussed the results of all of the tests and the disposition. The questions were answered. The  patient or family acknowledged understanding and was agreeable with the care plan.     Medical Decision Making  Obtained supplemental history:Supplemental history obtained?: Documented in chart  Reviewed external records: External records reviewed?: Documented in chart  Care impacted by chronic illness:Documented in Chart  Did you consider but not order tests?: Work up considered but not performed and documented in chart, if applicable  Did you interpret images independently?: Independent interpretation of ECG and images noted in documentation, when applicable.  Consultation discussion with other provider:Did you involve another provider (consultant, , pharmacy, etc.)?: No  Discharge. No recommendations on prescription strength medication(s). See documentation for any additional details.    MIPS: Not Applicable      MEDICATIONS GIVEN IN THE EMERGENCY:  Medications - No data to display    NEW PRESCRIPTIONS STARTED AT TODAY'S ER VISIT  Discharge Medication List as of 11/28/2024 12:47 PM             =================================================================    HPI    Patient information was obtained from: Patient and chart review    Use of : N/A         Victoria Solares is a 24 year old female with a pertinent history of anxiety who presents to this ED for evaluation of lab recheck, chest pain.  Patient reports she was seen on 11/22 for evaluation of left lower quadrant abdominal pain.  This did show suspicion for ectopic pregnancy.  She saw OB and was given methotrexate.  Her day 7 lab draw is on a holiday so she was told return to the emergency department.  She had a lab draw on day 4 which showed downtrending hCG.  She has had scant vaginal bleeding, notes brown blood with wiping.  Pain controlled with Tylenol.  Notes she was febrile during her visit.  Has been noticing left-sided chest pain and back pain over the past few days.  Unclear what makes this better or worse.  No shortness of breath.   Has been having a cough.  Unsure if ongoing fevers as she has been taking scheduled Tylenol.  No vomiting, no diarrhea.  She is otherwise healthy    Patient seen in this ED on 11/22/24 for LLQ pain, ultrasound showed a left adnexal mass concerning for ectopic pregnancy.  She was seen by OB in the ED.  She elected for methotrexate management.  Her 7 day draw falls on Thanksgiving so she was instructed to return to the ED for labs and potentially a second dose of methotrexate if indicated.    REVIEW OF SYSTEMS   Per HPI    PAST MEDICAL HISTORY:  Past Medical History:   Diagnosis Date    Anxiety     Panic attacks        PAST SURGICAL HISTORY:  Past Surgical History:   Procedure Laterality Date    TONSILLECTOMY             CURRENT MEDICATIONS:    etonogestrel-ethinyl estradiol (NUVARING) 0.12-0.015 MG/24HR vaginal ring  FLUOXETINE HCL PO  HYDROXYZINE HCL PO  levonorgestrel (MIRENA, 52 MG,) 20 MCG/24HR IUD         ALLERGIES:  Allergies   Allergen Reactions    Azithromycin Rash       FAMILY HISTORY:  No family history on file.    SOCIAL HISTORY:   Social History     Socioeconomic History    Marital status: Single   Tobacco Use    Smoking status: Never    Smokeless tobacco: Never   Substance and Sexual Activity    Alcohol use: No    Drug use: No    Sexual activity: Yes     Partners: Male     Birth control/protection: Other     Social Drivers of Health     Financial Resource Strain: Not At Risk (11/28/2023)    Received from "[facility]    Financial Resource Strain     Is it hard for you to pay for the very basics like food, housing, medical care or heating?: No   Food Insecurity: Not At Risk (11/28/2023)    Received from "[facility]    Food Insecurity     Does your food run out before you have the money to buy more?: No   Transportation Needs: Not At Risk (11/28/2023)    Received from "[facility]    Transportation Needs     Does a lack of transportation keep you from  "your medical appointments or from getting your medications?: No    Received from [facility]    Social Cognitive Match       VITALS:  /86   Pulse 82   Temp 98.9  F (37.2  C) (Oral)   Resp 16   Ht 1.6 m (5' 3\")   Wt 56.7 kg (125 lb)   SpO2 99%   BMI 22.14 kg/m      PHYSICAL EXAM    Physical Exam  Constitutional:       General: She is not in acute distress.  HENT:      Head: Normocephalic and atraumatic.      Mouth/Throat:      Pharynx: Oropharynx is clear.   Eyes:      Pupils: Pupils are equal, round, and reactive to light.   Cardiovascular:      Rate and Rhythm: Normal rate and regular rhythm.      Pulses: Normal pulses.      Heart sounds: Normal heart sounds.   Pulmonary:      Effort: Pulmonary effort is normal.      Breath sounds: Normal breath sounds.   Abdominal:      General: Abdomen is flat. Bowel sounds are normal.      Palpations: Abdomen is soft.      Tenderness: There is no abdominal tenderness.   Musculoskeletal:         General: Normal range of motion.   Skin:     General: Skin is warm and dry.      Capillary Refill: Capillary refill takes less than 2 seconds.   Neurological:      General: No focal deficit present.      Mental Status: She is alert and oriented to person, place, and time.           LAB:  All pertinent labs reviewed and interpreted.  Labs Ordered and Resulted from Time of ED Arrival to Time of ED Departure   CBC WITH PLATELETS - Abnormal       Result Value    WBC Count 3.8 (*)     RBC Count 4.32      Hemoglobin 14.2      Hematocrit 39.7      MCV 92      MCH 32.9      MCHC 35.8      RDW 11.9      Platelet Count 196     HCG QUANTITATIVE PREGNANCY - Abnormal    hCG Quantitative 107 (*)        RADIOLOGY:  Reviewed all pertinent imaging. Please see official radiology report.  Chest XR,  PA & LAT   Final Result   IMPRESSION: Lungs are clear. No hydropneumothorax or fracture. Heart and pulmonary vascularity are normal. No signs of acute disease.    "       EKG:    Performed at: 1122 AM 11/28/24    Impression: Sinus rhythm, rightward axis, no significant changes as compared to EKG obtained 3/31/2023    Rate: 81 bpm  Rhythm: Sinus  Axis: 88  101  84  MI Interval: 154 ms  QRS Interval: 90 ms  QTc Interval: 446 ms  ST Changes: None    I have independently reviewed and interpreted the EKG(s) documented above.      Jazmin Kirby DO  Emergency Medicine  [facility] EMERGENCY DEPARTMENT  [address] [place] [id number] [phone]  Dept: [phone]       Jazmin Kirby DO  11/28/24 0684

## 2024-11-28 NOTE — ED TRIAGE NOTES
Patient was seen here last Friday and was given methotrexate for an ectopic pregnancy--Was told to come back today to the ED to have her labs drawn to see if there is need for an additional dose.

## 2024-11-30 LAB
ATRIAL RATE - MUSE: 81 BPM
DIASTOLIC BLOOD PRESSURE - MUSE: NORMAL MMHG
INTERPRETATION ECG - MUSE: NORMAL
P AXIS - MUSE: 88 DEGREES
PR INTERVAL - MUSE: 154 MS
QRS DURATION - MUSE: 90 MS
QT - MUSE: 384 MS
QTC - MUSE: 446 MS
R AXIS - MUSE: 101 DEGREES
SYSTOLIC BLOOD PRESSURE - MUSE: NORMAL MMHG
T AXIS - MUSE: 84 DEGREES
VENTRICULAR RATE- MUSE: 81 BPM

## 2025-02-25 ENCOUNTER — TELEPHONE (OUTPATIENT)
Dept: HEMATOLOGY | Facility: CLINIC | Age: 25
End: 2025-02-25
Payer: COMMERCIAL

## 2025-02-25 NOTE — TELEPHONE ENCOUNTER
[id number]  Victoria Solares  24 year old female  [facility] Diagnosis: lupus anticoagulant and cardiolipin positive   [facility] Provider: VIRAL with Dr. Preston 4/14/25    Call received from Victoria looking to self refer for her hx of lupus anticoagulant and cardiolipin positive test results.     Victoria states that her PCP at  first ran the tests and then referred her to hematology at  to follow up on them. When the tests were positive on repeat testing in 2024, the hematologist at  told Victoria to see someone else. Victoria is now calling to establish with Dr. Preston or Dr. Henry.    Staff assisted in scheduling Victoria with Dr. Preston for 4/14/25 @1230pm. Victoria states she will be able to view these appt details in her mychart and denies any other needs from us at this time. She will reach out prn.    Records are available in [facility].    Akiko KANGN, RN, PHN   [facility]   Office: [phone]  Fax: [phone]

## 2025-04-14 ENCOUNTER — OFFICE VISIT (OUTPATIENT)
Dept: HEMATOLOGY | Facility: CLINIC | Age: 25
End: 2025-04-14
Attending: INTERNAL MEDICINE
Payer: COMMERCIAL

## 2025-04-14 VITALS
DIASTOLIC BLOOD PRESSURE: 83 MMHG | HEART RATE: 68 BPM | HEIGHT: 63 IN | SYSTOLIC BLOOD PRESSURE: 128 MMHG | TEMPERATURE: 97.1 F | OXYGEN SATURATION: 100 % | BODY MASS INDEX: 22.89 KG/M2 | WEIGHT: 129.2 LBS

## 2025-04-14 DIAGNOSIS — R76.0 ANTIPHOSPHOLIPID ANTIBODY POSITIVE: Primary | ICD-10-CM

## 2025-04-14 PROCEDURE — G0463 HOSPITAL OUTPT CLINIC VISIT: HCPCS | Performed by: INTERNAL MEDICINE

## 2025-04-14 PROCEDURE — 99205 OFFICE O/P NEW HI 60 MIN: CPT | Performed by: INTERNAL MEDICINE

## 2025-04-14 RX ORDER — SERTRALINE HYDROCHLORIDE 25 MG/1
25 TABLET, FILM COATED ORAL
COMMUNITY
Start: 2025-04-09 | End: 2026-04-09

## 2025-04-14 RX ORDER — LORAZEPAM 0.5 MG/1
0.5 TABLET ORAL PRN
COMMUNITY
Start: 2022-06-20

## 2025-04-14 NOTE — PROGRESS NOTES
[facility]  [facility]  [facility] [address], [address], [place] [address]  Main: [phone], Fax: [phone]        Outpatient Clinic Visit  Date:  2025    Victoria Solares is a 24-year-old woman here for consultation regarding her history of persistently positive antiphospholipid antibodies.  These were first identified in 2023 on testing that was prompted by her family history of a sister who had a fatal pulmonary embolism at age 27.  The sister had been using the NuvaRing for several years, but no other triggers were identified.  After this event, the family underwent thrombophilia testing.  Her father was found to have factor V Leiden.  Victoria was not found to have factor V Leiden but was found to have antiphospholipid antibodies (see details below).  She has no personal history of thrombosis and there are no other known episodes of venous thromboembolism in the extended family.    The initial testing was performed in 2023.  The genetic thrombophilia workup was negative, and included testing for factor V Leiden, prothrombin gene mutation, antithrombin, protein C, and protein S deficiencies.  Fibrinogen and factor VIII activities were also normal.  She was, however, found to have an indeterminate lupus anticoagulant.  She had a weakly positive cardiolipin IgM antibody (titer less than 40) with a negative IgG titer.  Beta-2 glycoprotein IgG and IgM titers were also negative.    Repeat testing in 2024 yielded the same results, with an indeterminate lupus anticoagulant, and a weakly positive (titer less than 40) cardiolipin IgM antibody with negative IgG titer and negative beta-2 glycoprotein IgG and IgM titers.      ASSESSMENT / PLAN:  1.  Antiphospholipid antibody positive  2.  Family history of venous thromboembolism    Victoria underwent thrombophilia testing after her older sister  of a pulmonary embolism.  This thrombotic  event was associated with chronic estrogen exposure (NuvaRing) and no other identified triggers.  Victoria was not found to have any identified genetic thrombophilia, but she was found to have a low titer cardiolipin IgM antibody and an indeterminate lupus anticoagulant.  Repeat testing 3 months later revealed the same findings.    We discussed that testing her for antiphospholipid antibodies in that context was not appropriate, as this is not an inheritable thrombophilia.  We also discussed that she does not meet criteria for the diagnosis of antiphospholipid antibody syndrome given that she has never had a thrombotic event.  In addition, she also does not clearly meet laboratory criteria as she only has a persistently positive low titer IgM cardiolipin antibody and an indeterminant lupus anticoagulant.  No further antiphospholipid antibody testing is indicated at this time.  She has no indication for anticoagulant or antiplatelet therapy.      We did discuss the option of repeat antiphospholipid antibody testing now, given that she was last tested a little over a year ago.  However, after detailed discussion, and the recognition that the results of this testing would not change our recommendations, she decided against additional laboratory testing at this time.    Given her family history, it would be advisable for her to avoid estrogen and to consider anticoagulant prophylaxis in the postpartum period, and possibly antepartum, should she become pregnant.  She should be seen again for further consultation in that regard, should the need arise.    During the course of our discussion, Victoria asked appropriate questions and appeared to have a good understanding of all the things we reviewed.  She was given our contact information and encouraged to call with any additional questions or concerns.    Total time on date of encounter 60 minutes, including review of medical records and labs, clinic visit, and  documentation.      Robert Preston MD  Professor of Medicine  Division of Hematology, Oncology, and Transplantation  Director, [facility]

## 2025-07-19 ENCOUNTER — HEALTH MAINTENANCE LETTER (OUTPATIENT)
Age: 25
End: 2025-07-19